# Patient Record
Sex: MALE | Race: WHITE | NOT HISPANIC OR LATINO | Employment: OTHER | URBAN - METROPOLITAN AREA
[De-identification: names, ages, dates, MRNs, and addresses within clinical notes are randomized per-mention and may not be internally consistent; named-entity substitution may affect disease eponyms.]

---

## 2017-11-10 ENCOUNTER — APPOINTMENT (OUTPATIENT)
Dept: GENERAL RADIOLOGY | Facility: CLINIC | Age: 72
End: 2017-11-10
Attending: FAMILY MEDICINE
Payer: MEDICARE

## 2017-11-10 ENCOUNTER — HOSPITAL ENCOUNTER (EMERGENCY)
Facility: CLINIC | Age: 72
Discharge: HOME | End: 2017-11-10
Attending: FAMILY MEDICINE | Admitting: FAMILY MEDICINE
Payer: MEDICARE

## 2017-11-10 ENCOUNTER — APPOINTMENT (OUTPATIENT)
Dept: ULTRASOUND IMAGING | Facility: CLINIC | Age: 72
End: 2017-11-10
Attending: FAMILY MEDICINE
Payer: MEDICARE

## 2017-11-10 ENCOUNTER — APPOINTMENT (OUTPATIENT)
Dept: CT IMAGING | Facility: CLINIC | Age: 72
End: 2017-11-10
Attending: FAMILY MEDICINE
Payer: MEDICARE

## 2017-11-10 VITALS
HEART RATE: 115 BPM | RESPIRATION RATE: 18 BRPM | TEMPERATURE: 101 F | SYSTOLIC BLOOD PRESSURE: 98 MMHG | DIASTOLIC BLOOD PRESSURE: 73 MMHG | OXYGEN SATURATION: 92 %

## 2017-11-10 DIAGNOSIS — R50.9 FEVER WITH CHILLS: ICD-10-CM

## 2017-11-10 DIAGNOSIS — R00.0 SINUS TACHYCARDIA: ICD-10-CM

## 2017-11-10 PROBLEM — Z96.651 S/P TOTAL KNEE REPLACEMENT, RIGHT: Status: ACTIVE | Noted: 2017-10-16

## 2017-11-10 LAB
ALBUMIN UR-MCNC: NEGATIVE MG/DL
ANION GAP SERPL CALCULATED.3IONS-SCNC: 6 MMOL/L (ref 3–14)
APPEARANCE UR: CLEAR
BASOPHILS # BLD AUTO: 0 10E9/L (ref 0–0.2)
BASOPHILS NFR BLD AUTO: 0.2 %
BILIRUB UR QL STRIP: NEGATIVE
BUN SERPL-MCNC: 13 MG/DL (ref 7–30)
CALCIUM SERPL-MCNC: 8.5 MG/DL (ref 8.5–10.1)
CHLORIDE SERPL-SCNC: 103 MMOL/L (ref 94–109)
CO2 SERPL-SCNC: 27 MMOL/L (ref 20–32)
COLOR UR AUTO: YELLOW
CREAT SERPL-MCNC: 0.8 MG/DL (ref 0.66–1.25)
D DIMER PPP FEU-MCNC: 2.9 UG/ML FEU (ref 0–0.5)
DIFFERENTIAL METHOD BLD: ABNORMAL
EOSINOPHIL # BLD AUTO: 0 10E9/L (ref 0–0.7)
EOSINOPHIL NFR BLD AUTO: 0.2 %
ERYTHROCYTE [DISTWIDTH] IN BLOOD BY AUTOMATED COUNT: 12.8 % (ref 10–15)
FLUAV+FLUBV AG SPEC QL: NEGATIVE
FLUAV+FLUBV AG SPEC QL: NEGATIVE
GFR SERPL CREATININE-BSD FRML MDRD: >90 ML/MIN/1.7M2
GLUCOSE SERPL-MCNC: 137 MG/DL (ref 70–99)
GLUCOSE UR STRIP-MCNC: NEGATIVE MG/DL
HCT VFR BLD AUTO: 39.7 % (ref 40–53)
HGB BLD-MCNC: 13.1 G/DL (ref 13.3–17.7)
HGB UR QL STRIP: ABNORMAL
IMM GRANULOCYTES # BLD: 0 10E9/L (ref 0–0.4)
IMM GRANULOCYTES NFR BLD: 0.2 %
INR PPP: 1.14 (ref 0.86–1.14)
KETONES UR STRIP-MCNC: NEGATIVE MG/DL
LACTATE BLD-SCNC: 1.5 MMOL/L (ref 0.7–2)
LACTATE SERPL-SCNC: 1.4 MMOL/L (ref 0.4–2)
LEUKOCYTE ESTERASE UR QL STRIP: NEGATIVE
LYMPHOCYTES # BLD AUTO: 0.4 10E9/L (ref 0.8–5.3)
LYMPHOCYTES NFR BLD AUTO: 3 %
MCH RBC QN AUTO: 32 PG (ref 26.5–33)
MCHC RBC AUTO-ENTMCNC: 33 G/DL (ref 31.5–36.5)
MCV RBC AUTO: 97 FL (ref 78–100)
MONOCYTES # BLD AUTO: 0.7 10E9/L (ref 0–1.3)
MONOCYTES NFR BLD AUTO: 5.1 %
MUCOUS THREADS #/AREA URNS LPF: PRESENT /LPF
NEUTROPHILS # BLD AUTO: 11.8 10E9/L (ref 1.6–8.3)
NEUTROPHILS NFR BLD AUTO: 91.3 %
NITRATE UR QL: NEGATIVE
NT-PROBNP SERPL-MCNC: 467 PG/ML (ref 0–900)
PH UR STRIP: 7 PH (ref 5–7)
PLATELET # BLD AUTO: 281 10E9/L (ref 150–450)
POTASSIUM SERPL-SCNC: 3.7 MMOL/L (ref 3.4–5.3)
RBC # BLD AUTO: 4.09 10E12/L (ref 4.4–5.9)
RBC #/AREA URNS AUTO: 5 /HPF (ref 0–2)
SODIUM SERPL-SCNC: 136 MMOL/L (ref 133–144)
SOURCE: ABNORMAL
SP GR UR STRIP: 1.02 (ref 1–1.03)
SPECIMEN SOURCE: NORMAL
TROPONIN I SERPL-MCNC: <0.015 UG/L (ref 0–0.04)
TSH SERPL DL<=0.005 MIU/L-ACNC: 0.86 MU/L (ref 0.4–4)
UROBILINOGEN UR STRIP-MCNC: 2 MG/DL (ref 0–2)
WBC # BLD AUTO: 12.9 10E9/L (ref 4–11)
WBC #/AREA URNS AUTO: 0 /HPF (ref 0–2)

## 2017-11-10 PROCEDURE — 25000132 ZZH RX MED GY IP 250 OP 250 PS 637: Mod: GY | Performed by: FAMILY MEDICINE

## 2017-11-10 PROCEDURE — 93971 EXTREMITY STUDY: CPT | Mod: RT

## 2017-11-10 PROCEDURE — 93010 ELECTROCARDIOGRAM REPORT: CPT | Mod: Z6 | Performed by: FAMILY MEDICINE

## 2017-11-10 PROCEDURE — 85025 COMPLETE CBC W/AUTO DIFF WBC: CPT | Performed by: FAMILY MEDICINE

## 2017-11-10 PROCEDURE — 81001 URINALYSIS AUTO W/SCOPE: CPT | Performed by: FAMILY MEDICINE

## 2017-11-10 PROCEDURE — 85610 PROTHROMBIN TIME: CPT | Performed by: FAMILY MEDICINE

## 2017-11-10 PROCEDURE — 87804 INFLUENZA ASSAY W/OPTIC: CPT | Performed by: FAMILY MEDICINE

## 2017-11-10 PROCEDURE — 71020 XR CHEST 2 VW: CPT

## 2017-11-10 PROCEDURE — 25000128 H RX IP 250 OP 636: Performed by: FAMILY MEDICINE

## 2017-11-10 PROCEDURE — 84443 ASSAY THYROID STIM HORMONE: CPT | Performed by: FAMILY MEDICINE

## 2017-11-10 PROCEDURE — 87040 BLOOD CULTURE FOR BACTERIA: CPT | Mod: 91 | Performed by: FAMILY MEDICINE

## 2017-11-10 PROCEDURE — 96360 HYDRATION IV INFUSION INIT: CPT

## 2017-11-10 PROCEDURE — 83605 ASSAY OF LACTIC ACID: CPT | Performed by: FAMILY MEDICINE

## 2017-11-10 PROCEDURE — 93005 ELECTROCARDIOGRAM TRACING: CPT

## 2017-11-10 PROCEDURE — 71260 CT THORAX DX C+: CPT

## 2017-11-10 PROCEDURE — 25000125 ZZHC RX 250: Performed by: FAMILY MEDICINE

## 2017-11-10 PROCEDURE — A9270 NON-COVERED ITEM OR SERVICE: HCPCS | Mod: GY | Performed by: FAMILY MEDICINE

## 2017-11-10 PROCEDURE — 80048 BASIC METABOLIC PNL TOTAL CA: CPT | Performed by: FAMILY MEDICINE

## 2017-11-10 PROCEDURE — 83880 ASSAY OF NATRIURETIC PEPTIDE: CPT | Performed by: FAMILY MEDICINE

## 2017-11-10 PROCEDURE — 99285 EMERGENCY DEPT VISIT HI MDM: CPT | Mod: 25

## 2017-11-10 PROCEDURE — 85379 FIBRIN DEGRADATION QUANT: CPT | Performed by: FAMILY MEDICINE

## 2017-11-10 PROCEDURE — 96361 HYDRATE IV INFUSION ADD-ON: CPT

## 2017-11-10 PROCEDURE — 84484 ASSAY OF TROPONIN QUANT: CPT | Performed by: FAMILY MEDICINE

## 2017-11-10 PROCEDURE — 99285 EMERGENCY DEPT VISIT HI MDM: CPT | Mod: 25 | Performed by: FAMILY MEDICINE

## 2017-11-10 RX ORDER — SODIUM CHLORIDE 9 MG/ML
1000 INJECTION, SOLUTION INTRAVENOUS CONTINUOUS
Status: DISCONTINUED | OUTPATIENT
Start: 2017-11-10 | End: 2017-11-10 | Stop reason: HOSPADM

## 2017-11-10 RX ORDER — ACETAMINOPHEN 500 MG
1000 TABLET ORAL ONCE
Status: COMPLETED | OUTPATIENT
Start: 2017-11-10 | End: 2017-11-10

## 2017-11-10 RX ORDER — IOPAMIDOL 755 MG/ML
90 INJECTION, SOLUTION INTRAVASCULAR ONCE
Status: COMPLETED | OUTPATIENT
Start: 2017-11-10 | End: 2017-11-10

## 2017-11-10 RX ADMIN — SODIUM CHLORIDE 110 ML: 9 INJECTION, SOLUTION INTRAVENOUS at 09:08

## 2017-11-10 RX ADMIN — SODIUM CHLORIDE 1000 ML: 9 INJECTION, SOLUTION INTRAVENOUS at 11:38

## 2017-11-10 RX ADMIN — IOPAMIDOL 90 ML: 755 INJECTION, SOLUTION INTRAVENOUS at 09:08

## 2017-11-10 RX ADMIN — SODIUM CHLORIDE 500 ML: 9 INJECTION, SOLUTION INTRAVENOUS at 08:08

## 2017-11-10 RX ADMIN — ACETAMINOPHEN 1000 MG: 500 TABLET ORAL at 12:11

## 2017-11-10 ASSESSMENT — ENCOUNTER SYMPTOMS
ABDOMINAL PAIN: 0
CONSTIPATION: 0
FREQUENCY: 0
VOMITING: 0
FEVER: 0
PALPITATIONS: 0
DYSURIA: 0
SHORTNESS OF BREATH: 1
DIAPHORESIS: 0
CHILLS: 1
COUGH: 0
BLOOD IN STOOL: 0
DIARRHEA: 0
SINUS PRESSURE: 0
WHEEZING: 0
SORE THROAT: 0
NAUSEA: 0
HEADACHES: 0

## 2017-11-10 NOTE — ED NOTES
Pt alert answering all questions appropriately, following all commands, clear speech, no facial droop. Pt reports having CP off and on, short twinges of pain.

## 2017-11-10 NOTE — ED AVS SNAPSHOT
[facility] Emergency Department    [address] [address]    Phone:  [phone]    Fax:  [phone]                                       Rick Rodrigues   MRN: [id number]    Department:  [facility] Emergency Department   Date of Visit:  11/10/2017           After Visit Summary Signature Page     I have received my discharge instructions, and my questions have been answered. I have discussed any challenges I see with this plan with the nurse or doctor.    ..........................................................................................................................................  Patient/Patient Representative Signature      ..........................................................................................................................................  Patient Representative Print Name and Relationship to Patient    ..................................................               ................................................  Date                                            Time    ..........................................................................................................................................  Reviewed by Signature/Title    ...................................................              ..............................................  Date                                                            Time

## 2017-11-10 NOTE — ED PROVIDER NOTES
History     Chief Complaint   Patient presents with     Chills     Pt having chills yesterday around 8 pm.  Pt shaking, needing to be warmed up.     Altered Mental Status     Pt woke up slightly confused, pt and wife noticed the confusion.  Wife noticed artery in R neck was pumping     Chest Pain     Had 2 short episodes of chest pain.     HPI  Rick Rodrigues is a 72 year old male who presents with a history of recent right knee replacement in the last month and for which she was on warfarin up until a week ago and then has since been on aspirin 325 mg, also with a history of sleep apnea, coronary artery disease, hyperlipidemia, hypertension, small abdominal aortic aneurysm of 3 cm in 2010 and 2014.  Pending repeat ultrasound this year.    Last evening he was feeling chilled and could not get warm.  That seemed to resolve overnight.  No associated fever or infectious symptoms denied associated cough and upper respiratory congestion.  No abdominal pain.  No change in urine or stool.  Felt thirsty.  This morning he had 2 very brief episodes of left-sided chest pain that lasted for a couple seconds each.  This seemed to resolve.  Overnight some sense of dyspnea.  No difficulty on exertion.  Able to ambulate into this department without associated chest pain or shortness of breath.  No lightheadedness.    No prior history of DVT but does note increased swelling of the right lower extremity.    Problem List:    Patient Active Problem List    Diagnosis Date Noted     S/P total knee replacement, right 10/16/2017     Priority: Medium     Sleep apnea 12/21/2012     Priority: Medium     AAA (abdominal aortic aneurysm) (H) 09/02/2010     Priority: Medium     Overview:   3 cm just above bifurcation  Recommend q2-3yr u/s monitoring  No change dec 2014 repeat 2017       Coronary atherosclerosis 07/18/2007     Priority: Medium     Overview:   pos heart scan 1/05  Neg cardiolyte gxt 2003, 1/2008,  Ej fx 57%        Hyperlipidemia 07/18/2007     Priority: Medium     Esophageal reflux 07/18/2007     Priority: Medium     Essential hypertension 07/18/2007     Priority: Medium        Past Medical History:    No past medical history on file.    Past Surgical History:    Past Surgical History:   Procedure Laterality Date     COLONOSCOPY  12/20/2010    COLONOSCOPY performed by KURT SCOTT at [facility]     COLONOSCOPY  5/26/2011    Procedure:COLONOSCOPY; Surgeon:KURT SCOTT; Location:[facility]       Family History:    No family history on file.    Social History:  Marital Status:   [2]  Social History   Substance Use Topics     Smoking status: Not on file     Smokeless tobacco: Not on file     Alcohol use Not on file        Medications:      warfarin (COUMADIN) 2.5 MG tablet   atorvastatin (LIPITOR) 40 MG tablet   clotrimazole-betamethasone (LOTRISONE) cream   aspirin - buffered ([employer] BUFFERED ASPIRIN) 325 MG TABS tablet   acetaminophen (TYLENOL) 500 MG tablet   NIFEdipine ER osmotic (PROCARDIA XL) 60 MG TB24   omeprazole (PRILOSEC) 20 MG CR capsule   AMOXICILLIN PO   Acetaminophen (TYLENOL PO)   omeprazole (PRILOSEC) 20 MG capsule   NIFEdipine (ADALAT CC) 60 MG 24 hr tablet   fenofibrate 160 MG tablet   pravastatin (PRAVACHOL) 40 MG tablet   aspirin 81 MG tablet   ibuprofen (ADVIL,MOTRIN) 600 MG tablet         Review of Systems   Constitutional: Positive for chills. Negative for diaphoresis and fever.   HENT: Negative for ear pain, sinus pressure and sore throat.    Eyes: Negative for visual disturbance.   Respiratory: Positive for shortness of breath. Negative for cough and wheezing.    Cardiovascular: Positive for chest pain. Negative for palpitations.   Gastrointestinal: Negative for abdominal pain, blood in stool, constipation, diarrhea, nausea and vomiting.   Genitourinary: Negative for dysuria, frequency and urgency.   Skin: Negative for rash.   Neurological: Negative for headaches.   All other systems reviewed and are  negative.      Physical Exam   BP: (!) 142/92  Pulse: 124  Heart Rate: 124  Temp: 99.8  F (37.7  C)  Resp: 16  SpO2: 94 %      Physical Exam   Constitutional: He appears distressed.   HENT:   Mouth/Throat: Oropharynx is clear and moist.   Eyes: Conjunctivae are normal.   Neck: Neck supple.   Cardiovascular: Regular rhythm.  Tachycardia present.  Exam reveals no gallop and no friction rub.    No murmur heard.  Pulmonary/Chest: Effort normal and breath sounds normal. No respiratory distress. He has no wheezes. He has no rales.   Abdominal: Soft. Bowel sounds are normal. He exhibits no distension. There is no tenderness. There is no rebound and no guarding.   Musculoskeletal: He exhibits no edema.   Neurological: He is alert.   Skin: No rash noted. He is not diaphoretic.      Knee is without signs of infection.    ED Course     ED Course     Procedures                  EKG Interpretation:      Interpreted by Jeff Diaz MD  EKG done at 733 hours demonstrates a sinus rhythm at 122 bpm with a normal axis and no ST-T change.  No T wave changes.  Other than flattening of the T waves in leads 23 aVF.  As well as the lateral leads V5 and V6.  Normal intervals - except for a short NV.  Normal conduction.  Poor R progression V1 through V3.  Q waves in the inferior leads.  No ectopy.    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     Critical Care time:  none               Results for orders placed or performed during the hospital encounter of 11/10/17   XR Chest 2 Views    Narrative    CHEST TWO VIEWS  11/10/2017 9:10 AM    HISTORY:  Sinus tachycardia.    COMPARISON:  None.      Impression    IMPRESSION:  Negative.     LEAH MARTÍNEZ MD   US Lower Extremity Venous Duplex Right    Narrative    VENOUS ULTRASOUND RIGHT LEG  11/10/2017 8:56 AM     HISTORY: edema, recent TKA;     COMPARISON: None.    FINDINGS:  Examination of the deep veins with graded compression and  color flow Doppler with spectral  wave form analysis shows no evidence  of thrombus in the common femoral vein, femoral vein, popliteal vein  or calf veins.        Impression    IMPRESSION: No evidence of deep venous thrombosis in the right lower  extremity.     HERMES DOYLE, DO   Chest CT - IV contrast only - PE protocol    Narrative    CT CHEST PULMONARY EMBOLISM WITH CONTRAST  11/10/2017 9:13 AM     HISTORY: Chest pain, dyspnea. Status post total knee arthroplasty.  Evaluate for PE, as above.    TECHNIQUE: Thin section axial images are performed from the thoracic  inlet to the lung bases utilizing 90 mL of Isovue 370 IV contrast  without adverse event. Coronal reformatted images are also generated.  Radiation dose for this scan was reduced using automated exposure  control, adjustment of the mA and/or kV according to patient size, or  iterative reconstruction technique.    FINDINGS:  A few calcified pleural plaques are noted bilaterally in  the lower hemithoraces. Findings could reflect previous asbestos  exposure. Old hemothorax could account for these findings as well. No  focal infiltrate or consolidation. Atelectatic lung base changes are  noted. A large hiatal hernia is present. No enlarged lymph nodes. No  evidence of pulmonary embolism. Thoracic aorta is unremarkable with  scattered calcified atherosclerotic plaque. Limited images upper  abdomen demonstrate mild thickening left adrenal gland. Partially  imaged cyst is noted in the mid left kidney. Numerous colonic  diverticula are noted where imaged. Degenerative spine changes are  present.      Impression    IMPRESSION:  1. No evidence of pulmonary embolism. Thoracic aorta is unremarkable.  2. Lungs are clear. No enlarged lymph nodes.  3. Large hiatal hernia without obvious obstruction.  4. Simple appearing, partially imaged left renal cyst. No obvious  hydronephrosis where visualized.  5. Colonic diverticulosis.    LUZ DELACRUZ MD   CBC with platelets differential   Result Value Ref  Range    WBC 12.9 (H) 4.0 - 11.0 10e9/L    RBC Count 4.09 (L) 4.4 - 5.9 10e12/L    Hemoglobin 13.1 (L) 13.3 - 17.7 g/dL    Hematocrit 39.7 (L) 40.0 - 53.0 %    MCV 97 78 - 100 fl    MCH 32.0 26.5 - 33.0 pg    MCHC 33.0 31.5 - 36.5 g/dL    RDW 12.8 10.0 - 15.0 %    Platelet Count 281 150 - 450 10e9/L    Diff Method Automated Method     % Neutrophils 91.3 %    % Lymphocytes 3.0 %    % Monocytes 5.1 %    % Eosinophils 0.2 %    % Basophils 0.2 %    % Immature Granulocytes 0.2 %    Absolute Neutrophil 11.8 (H) 1.6 - 8.3 10e9/L    Absolute Lymphocytes 0.4 (L) 0.8 - 5.3 10e9/L    Absolute Monocytes 0.7 0.0 - 1.3 10e9/L    Absolute Eosinophils 0.0 0.0 - 0.7 10e9/L    Absolute Basophils 0.0 0.0 - 0.2 10e9/L    Abs Immature Granulocytes 0.0 0 - 0.4 10e9/L   Basic metabolic panel   Result Value Ref Range    Sodium 136 133 - 144 mmol/L    Potassium 3.7 3.4 - 5.3 mmol/L    Chloride 103 94 - 109 mmol/L    Carbon Dioxide 27 20 - 32 mmol/L    Anion Gap 6 3 - 14 mmol/L    Glucose 137 (H) 70 - 99 mg/dL    Urea Nitrogen 13 7 - 30 mg/dL    Creatinine 0.80 0.66 - 1.25 mg/dL    GFR Estimate >90 >60 mL/min/1.7m2    GFR Estimate If Black >90 >60 mL/min/1.7m2    Calcium 8.5 8.5 - 10.1 mg/dL   Troponin I   Result Value Ref Range    Troponin I ES <0.015 0.000 - 0.045 ug/L   D dimer quantitative   Result Value Ref Range    D Dimer 2.9 (H) 0.0 - 0.50 ug/ml FEU   Lactic acid   Result Value Ref Range    Lactic Acid 1.4 0.4 - 2.0 mmol/L   Nt probnp inpatient (BNP)   Result Value Ref Range    N-Terminal Pro BNP Inpatient 467 0 - 900 pg/mL   UA with Microscopic   Result Value Ref Range    Color Urine Yellow     Appearance Urine Clear     Glucose Urine Negative NEG^Negative mg/dL    Bilirubin Urine Negative NEG^Negative    Ketones Urine Negative NEG^Negative mg/dL    Specific Gravity Urine 1.016 1.003 - 1.035    Blood Urine Trace (A) NEG^Negative    pH Urine 7.0 5.0 - 7.0 pH    Protein Albumin Urine Negative NEG^Negative mg/dL    Urobilinogen mg/dL  2.0 0.0 - 2.0 mg/dL    Nitrite Urine Negative NEG^Negative    Leukocyte Esterase Urine Negative NEG^Negative    Source Midstream Urine     WBC Urine 0 0 - 2 /HPF    RBC Urine 5 (H) 0 - 2 /HPF    Mucous Urine Present (A) NEG^Negative /LPF   INR   Result Value Ref Range    INR 1.14 0.86 - 1.14   TSH with free T4 reflex   Result Value Ref Range    TSH 0.86 0.40 - 4.00 mU/L   Lactic acid whole blood   Result Value Ref Range    Lactic Acid 1.5 0.7 - 2.0 mmol/L   Influenza A/B antigen   Result Value Ref Range    Influenza A/B Agn Specimen Nasal     Influenza A Negative NEG^Negative    Influenza B Negative NEG^Negative   Blood culture   Result Value Ref Range    Specimen Description Blood Left Arm     Special Requests Aerobic and anaerobic bottles received     Culture Micro No growth after 1 hour    Blood culture   Result Value Ref Range    Specimen Description Blood Right Arm     Special Requests Aerobic and anaerobic bottles received     Culture Micro No growth after 1 hour          Assessments & Plan (with Medical Decision Making)     MDM: Rick Rodrigues is a 72 year old male Who presented with a history of recent total knee arthroplasty RT, and chills and low-grade temperature in 99 range, As well as possible lower extremity edema right side more than left.  He also had a sinus tachycardia that was fluid responsive, but no associated localizing symptoms.   Differential diagnosis was primarily Infectious vs VTE vs cardiac cause.   His knee with TKA demonstrated completely normal range of motion with no overlying erythema or swelling or signs of infection.      Initial laboratory testing included a chest x-ray as well as initial EKG, laboratory testing including troponin and lactic acid as well as d-dimer.  EKG showed only a sinus tachycardia.  Lactic acid was normal.  Before the d-dimer was back a right venous Ultrasound was performed and was negative for DVT.  D-dimer was elevated in the CT chest was performed  which demonstrated no PE and no pneumonia.     Patient then Spiked a temperature to 101, And was given Tylenol as well as  Additional fluid bolus. Influenza swab was obtained. Influenza negative.  Blood cultures were also performed in lactic acid was repeated as it had been two hours from the first.  This lactate was also normal.     Source was unclear in heart rate did decrease to the low 100 range.  The patient feels well and wishes to return home.  I offered observation stay given the fever without obvious source and tachycardia.  He understands the potential for occult infection that may progress.  We discussed possible infections such as SBE, intraabdominal source, meningitis.  The knee may develop findings.  persistent fever without source may be tick borne.   This may also be A viral syndrome as it has been present for less than 24 hours and may not have associated findings.     I told him that if he is not improving or is worsening over the weekend that he should be reevaluated.  I wish to be cautious regarding his history of total knee And also the sinus tachycardia seen today.          I have reviewed the nursing notes.    I have reviewed the findings, diagnosis, plan and need for follow up with the patient.     New Prescriptions    No medications on file       Final diagnoses:   Fever with chills - unclear cause.  No obvious source of infection.  stay hydrated.  recheck in clinic by monday, and immed here for worsening.   also have knee reevaluated monday   Sinus tachycardia - This appears associated with fever, dehydration - but may be associated with serious infection.  stay hydrated.  tylenol for fever.       11/10/2017   [facility] EMERGENCY DEPARTMENT     Jeff Diaz MD  11/10/17 2003

## 2017-11-10 NOTE — ED NOTES
Patient ambulated to restroom.      Blood cultures, influenza and repeat lactate orders received from MD.

## 2017-11-10 NOTE — DISCHARGE INSTRUCTIONS
ICD-10-CM    1. Fever with chills R50.9     unclear cause.  No obvious source of infection.  stay hydrated.  recheck in clinic by monday, and immed here for worsening.   also have knee reevaluated monday   2. Sinus tachycardia R00.0     This appears associated with fever, dehydration - but may be associated with serious infection.  stay hydrated.  tylenol for fever.         Febrile Illness, Uncertain Cause (Adult)  You have a fever, but the cause is not certain. A fever is a natural reaction of the body to an illness such as infection due to a virus or bacteria. In most cases, the temperature itself is not harmful. It actually helps the body fight infections. A fever does not need to be treated unless you feel very uncomfortable.  Sometimes a fever can be an early sign of a more serious infection, so make sure to follow up if your condition worsens.  Home care  Unless given other instructions by your healthcare provider, follow these guidelines when caring for yourself at home.  General care    If your symptoms are severe, rest at home for the first 2 to 3 days. When you resume activity, don't let yourself get too tired.    Do not smoke. Also avoid being exposed to secondhand smoke.    Your appetite may be poor, so a light diet is fine. Avoid dehydration by drinking 6 to 8 glasses of fluids per day (such as water, soft drinks, sports drinks, juices, tea, or soup). Extra fluids will help loosen secretions in the nose and lungs.  Medicines    You can take acetaminophen or ibuprofen for pain, unless you were given a different fever-reducing/pain medicine to use. (Note: If you have chronic liver or kidney disease or have ever had a stomach ulcer or gastrointestinal bleeding, talk with your healthcare provider before using these medicines. Also talk to your provider if you are taking medicine to prevent blood clots.) Aspirin should never be given to anyone younger than 18 years of age who is ill with a viral infection  or fever. It may cause severe liver or brain damage.    If you were given antibiotics, take them until they are used up, or your healthcare provider tells you to stop. It is important to finish the antibiotics even though you feel better. This is to make sure the infection has cleared. Be aware that antibiotics are not usually given for a fever with an unknown cause.    Over-the-counter medicines will not shorten the duration of the illness. However, they may be helpful for the following symptoms: cough, sore throat, or nasal and sinus congestion. Ask your pharmacist for product suggestions. (Note: Do not use decongestants if you have high blood pressure.)  Follow-up care  Follow up with your healthcare provider, or as advised.    If a culture was done, you will be notified if your treatment needs to be changed. You can call as directed for the results.    If X-rays, a CT, or an ultrasound were done, a specialist will review them. You will be notified of any findings that may affect your care.  Call 911  Contact emergency services right away if any of these occur:    Trouble breathing or swallowing, or wheezing    Chest pain    Confusion    Extreme drowsiness or trouble awakening    Fainting or loss of consciousness    Rapid heart rate    Low blood pressure    Vomiting blood, or large amounts of blood in stool    Seizure  When to seek medical advice  Call your healthcare provider right away if any of these occur:    Cough with lots of colored sputum (mucus) or blood in your sputum    Severe headache    Face, neck, throat, or ear pain    Feeling drowsy    Abdominal pain    Repeated vomiting or diarrhea    Joint pain or a new rash    Burning when urinating    Fever of 100.4 F (38 C) or higher, that does not get better after taking fever-reducing medicine    Feeling weak or dizzy  Date Last Reviewed: 7/30/2015 2000-2017 [employer]. [address] [place] [address], [place] [address]. All rights reserved. This  information is not intended as a substitute for professional medical care. Always follow your healthcare professional's instructions.

## 2017-11-10 NOTE — ED AVS SNAPSHOT
[facility] Department    [address] [address]    Phone:  [phone]    Fax:  [phone]                                       Rick Rodrigues   MRN: [id number]    Department:  [facility]   Date of Visit:  11/10/2017           Patient Information     Date Of Birth          1945        Your diagnoses for this visit were:     Fever with chills unclear cause.  No obvious source of infection.  stay hydrated.  recheck in clinic by monday, and immed here for worsening.   also have knee reevaluated monday    Sinus tachycardia This appears associated with fever, dehydration - but may be associated with serious infection.  stay hydrated.  tylenol for fever.       You were seen by Jeff Diaz MD.      Follow-up Information     Follow up with Efraín Jeff MD In 3 days.    Specialty:  Family Practice    Contact information:    [facility]  [address] [address]  [phone]          Follow up with [facility].    Specialty:  EMERGENCY MEDICINE    Why:  As needed, If symptoms worsen    Contact information:    [address] [place] [id number] [phone]    Additional information:    The medical center is located at   [address]. (between [address] and   [address] in [place], [place] north   of [place]).        Discharge Instructions         ICD-10-CM    1. Fever with chills R50.9     unclear cause.  No obvious source of infection.  stay hydrated.  recheck in clinic by monday, and immed here for worsening.   also have knee reevaluated monday   2. Sinus tachycardia R00.0     This appears associated with fever, dehydration - but may be associated with serious infection.  stay hydrated.  tylenol for fever.         Febrile Illness, Uncertain Cause (Adult)  You have a fever, but the cause is not certain. A fever is a natural reaction of the body to an illness such as  infection due to a virus or bacteria. In most cases, the temperature itself is not harmful. It actually helps the body fight infections. A fever does not need to be treated unless you feel very uncomfortable.  Sometimes a fever can be an early sign of a more serious infection, so make sure to follow up if your condition worsens.  Home care  Unless given other instructions by your healthcare provider, follow these guidelines when caring for yourself at home.  General care    If your symptoms are severe, rest at home for the first 2 to 3 days. When you resume activity, don't let yourself get too tired.    Do not smoke. Also avoid being exposed to secondhand smoke.    Your appetite may be poor, so a light diet is fine. Avoid dehydration by drinking 6 to 8 glasses of fluids per day (such as water, soft drinks, sports drinks, juices, tea, or soup). Extra fluids will help loosen secretions in the nose and lungs.  Medicines    You can take acetaminophen or ibuprofen for pain, unless you were given a different fever-reducing/pain medicine to use. (Note: If you have chronic liver or kidney disease or have ever had a stomach ulcer or gastrointestinal bleeding, talk with your healthcare provider before using these medicines. Also talk to your provider if you are taking medicine to prevent blood clots.) Aspirin should never be given to anyone younger than 18 years of age who is ill with a viral infection or fever. It may cause severe liver or brain damage.    If you were given antibiotics, take them until they are used up, or your healthcare provider tells you to stop. It is important to finish the antibiotics even though you feel better. This is to make sure the infection has cleared. Be aware that antibiotics are not usually given for a fever with an unknown cause.    Over-the-counter medicines will not shorten the duration of the illness. However, they may be helpful for the following symptoms: cough, sore throat, or nasal  and sinus congestion. Ask your pharmacist for product suggestions. (Note: Do not use decongestants if you have high blood pressure.)  Follow-up care  Follow up with your healthcare provider, or as advised.    If a culture was done, you will be notified if your treatment needs to be changed. You can call as directed for the results.    If X-rays, a CT, or an ultrasound were done, a specialist will review them. You will be notified of any findings that may affect your care.  Call 911  Contact emergency services right away if any of these occur:    Trouble breathing or swallowing, or wheezing    Chest pain    Confusion    Extreme drowsiness or trouble awakening    Fainting or loss of consciousness    Rapid heart rate    Low blood pressure    Vomiting blood, or large amounts of blood in stool    Seizure  When to seek medical advice  Call your healthcare provider right away if any of these occur:    Cough with lots of colored sputum (mucus) or blood in your sputum    Severe headache    Face, neck, throat, or ear pain    Feeling drowsy    Abdominal pain    Repeated vomiting or diarrhea    Joint pain or a new rash    Burning when urinating    Fever of 100.4 F (38 C) or higher, that does not get better after taking fever-reducing medicine    Feeling weak or dizzy  Date Last Reviewed: 7/30/2015 2000-2017 [employer]. [address] [place] [address], [place] [address]. All rights reserved. This information is not intended as a substitute for professional medical care. Always follow your healthcare professional's instructions.          Future Appointments        Provider Department Dept Phone Center    11/10/2017 3:00 PM [facility] [place], [place] ROOM 1 [facility] [phone] [facility]      24 Hour Appointment Hotline       To make an appointment at any [facility], call [phone] ([phone]). If you don't have a family doctor or clinic, we will help you find one. [facility] are conveniently  located to serve the needs of you and your family.             Review of your medicines      Our records show that you are taking the medicines listed below. If these are incorrect, please call your family doctor or clinic.        Dose / Directions Last dose taken    acetaminophen 500 MG tablet   Commonly known as:  TYLENOL   Dose:  1000 mg        Take 1,000 mg by mouth every 8 hours as needed   Refills:  0        AMOXICILLIN PO        Take  by mouth.   Refills:  0        atorvastatin 40 MG tablet   Commonly known as:  LIPITOR   Dose:  40 mg        Take 40 mg by mouth   Refills:  0        [employer] BUFFERED ASPIRIN 325 MG Tabs tablet   Dose:  325 mg   Generic drug:  aspirin - buffered        Take 325 mg by mouth daily   Refills:  0        ibuprofen 600 MG tablet   Commonly known as:  ADVIL/MOTRIN   Dose:  600 mg        Take 600 mg by mouth every 8 hours as needed   Refills:  0        NIFEdipine ER osmotic 60 MG Tb24   Commonly known as:  PROCARDIA XL   Dose:  60 mg        Take 60 mg by mouth daily   Refills:  0        omeprazole 20 MG CR capsule   Commonly known as:  priLOSEC   Dose:  20 mg        Take 20 mg by mouth daily   Refills:  0        OXYCODONE HCL PO   Dose:  5 mg        Take 5 mg by mouth every 6 hours as needed   Refills:  0        VITAMIN D (CHOLECALCIFEROL) PO   Dose:  2000 Units        Take 2,000 Units by mouth daily   Refills:  0                Procedures and tests performed during your visit     Procedure/Test Number of Times Performed    Basic metabolic panel 1    Blood culture 2    CBC with platelets differential 1    Cardiac Continuous Monitoring 1    Chest CT - IV contrast only - PE protocol 1    D dimer quantitative 1    EKG 12 lead 1    INR 1    Influenza A/B antigen 1    Lactic acid 1    Lactic acid whole blood 1    Nt probnp inpatient (BNP) 1    Peripheral IV catheter 1    TSH with free T4 reflex 1    Troponin I 1    UA with Microscopic 1    US Lower Extremity Venous Duplex Right 1    XR Chest 2  Views 1      Orders Needing Specimen Collection     None      Pending Results     Date and Time Order Name Status Description    11/10/2017 1210 Blood culture In process     11/10/2017 1210 Blood culture In process             Pending Culture Results     Date and Time Order Name Status Description    11/10/2017 1210 Blood culture In process     11/10/2017 1210 Blood culture In process             Pending Results Instructions     If you had any lab results that were not finalized at the time of your Discharge, you can call the ED Lab Result RN at [phone]. You will be contacted by this team for any positive Lab results or changes in treatment. The nurses are available 7 days a week from 10A to 6:30P.  You can leave a message 24 hours per day and they will return your call.        Test Results From Your Hospital Stay        11/10/2017  7:47 AM      Component Results     Component Value Ref Range & Units Status    WBC 12.9 (H) 4.0 - 11.0 10e9/L Final    RBC Count 4.09 (L) 4.4 - 5.9 10e12/L Final    Hemoglobin 13.1 (L) 13.3 - 17.7 g/dL Final    Hematocrit 39.7 (L) 40.0 - 53.0 % Final    MCV 97 78 - 100 fl Final    MCH 32.0 26.5 - 33.0 pg Final    MCHC 33.0 31.5 - 36.5 g/dL Final    RDW 12.8 10.0 - 15.0 % Final    Platelet Count 281 150 - 450 10e9/L Final    Diff Method Automated Method  Final    % Neutrophils 91.3 % Final    % Lymphocytes 3.0 % Final    % Monocytes 5.1 % Final    % Eosinophils 0.2 % Final    % Basophils 0.2 % Final    % Immature Granulocytes 0.2 % Final    Absolute Neutrophil 11.8 (H) 1.6 - 8.3 10e9/L Final    Absolute Lymphocytes 0.4 (L) 0.8 - 5.3 10e9/L Final    Absolute Monocytes 0.7 0.0 - 1.3 10e9/L Final    Absolute Eosinophils 0.0 0.0 - 0.7 10e9/L Final    Absolute Basophils 0.0 0.0 - 0.2 10e9/L Final    Abs Immature Granulocytes 0.0 0 - 0.4 10e9/L Final         11/10/2017  8:21 AM      Component Results     Component Value Ref Range & Units Status    Sodium 136 133 - 144 mmol/L Final     Potassium 3.7 3.4 - 5.3 mmol/L Final    Chloride 103 94 - 109 mmol/L Final    Carbon Dioxide 27 20 - 32 mmol/L Final    Anion Gap 6 3 - 14 mmol/L Final    Glucose 137 (H) 70 - 99 mg/dL Final    Urea Nitrogen 13 7 - 30 mg/dL Final    Creatinine 0.80 0.66 - 1.25 mg/dL Final    GFR Estimate >90 >60 mL/min/1.7m2 Final    Non  GFR Calc    GFR Estimate If Black >90 >60 mL/min/1.7m2 Final    African American GFR Calc    Calcium 8.5 8.5 - 10.1 mg/dL Final         11/10/2017  8:21 AM      Component Results     Component Value Ref Range & Units Status    Troponin I ES <0.015 0.000 - 0.045 ug/L Final    The 99th percentile for upper reference range is 0.045 ug/L.  Troponin values   in the range of 0.045 - 0.120 ug/L may be associated with risks of adverse   clinical events.           11/10/2017  8:54 AM      Component Results     Component Value Ref Range & Units Status    D Dimer 2.9 (H) 0.0 - 0.50 ug/ml FEU Final    This D-dimer assay is intended for use in conjunction with a clinical pretest   probability assessment model to exclude pulmonary embolism (PE) and deep   venous thrombosis (DVT) in outpatients suspected of PE or DVT. The cut-off   value is 0.5 ug/mL FEU.           11/10/2017  8:54 AM      Component Results     Component Value Ref Range & Units Status    Lactic Acid 1.4 0.4 - 2.0 mmol/L Final         11/10/2017  9:04 AM      Component Results     Component Value Ref Range & Units Status    N-Terminal Pro BNP Inpatient 467 0 - 900 pg/mL Final       Reference range shown and results flagged as abnormal are suggested inpatient   cut points for confirming diagnosis if CHF in an acute setting. Establishing a   baseline value for each individual patient is useful for follow-up. An   inpatient or emergency department NT-proPBNP <300 pg/mL effectively rules out   acute CHF, with 99% negative predictive value.  The outpatient non-acute reference range for ruling out CHF is:   0-125 pg/mL (age 18 to less  than 75)   0-450 pg/mL (age 75 yrs and older)           11/10/2017  9:55 AM      Component Results     Component Value Ref Range & Units Status    Color Urine Yellow  Final    Appearance Urine Clear  Final    Glucose Urine Negative NEG^Negative mg/dL Final    Bilirubin Urine Negative NEG^Negative Final    Ketones Urine Negative NEG^Negative mg/dL Final    Specific Gravity Urine 1.016 1.003 - 1.035 Final    Blood Urine Trace (A) NEG^Negative Final    pH Urine 7.0 5.0 - 7.0 pH Final    Protein Albumin Urine Negative NEG^Negative mg/dL Final    Urobilinogen mg/dL 2.0 0.0 - 2.0 mg/dL Final    Nitrite Urine Negative NEG^Negative Final    Leukocyte Esterase Urine Negative NEG^Negative Final    Source Midstream Urine  Final    WBC Urine 0 0 - 2 /HPF Final    RBC Urine 5 (H) 0 - 2 /HPF Final    Mucous Urine Present (A) NEG^Negative /LPF Final         11/10/2017  8:54 AM      Component Results     Component Value Ref Range & Units Status    INR 1.14 0.86 - 1.14 Final         11/10/2017 10:12 AM      Narrative     CHEST TWO VIEWS  11/10/2017 9:10 AM    HISTORY:  Sinus tachycardia.    COMPARISON:  None.        Impression     IMPRESSION:  Negative.     LEAH MARTÍNEZ MD         11/10/2017  9:07 AM      Component Results     Component Value Ref Range & Units Status    TSH 0.86 0.40 - 4.00 mU/L Final         11/10/2017  8:59 AM      Narrative     VENOUS ULTRASOUND RIGHT LEG  11/10/2017 8:56 AM     HISTORY: edema, recent TKA;     COMPARISON: None.    FINDINGS:  Examination of the deep veins with graded compression and  color flow Doppler with spectral wave form analysis shows no evidence  of thrombus in the common femoral vein, femoral vein, popliteal vein  or calf veins.          Impression     IMPRESSION: No evidence of deep venous thrombosis in the right lower  extremity.     HERMES DOYLE DO         11/10/2017  9:34 AM      Narrative     CT CHEST PULMONARY EMBOLISM WITH CONTRAST  11/10/2017 9:13 AM     HISTORY: Chest  pain, dyspnea. Status post total knee arthroplasty.  Evaluate for PE, as above.    TECHNIQUE: Thin section axial images are performed from the thoracic  inlet to the lung bases utilizing 90 mL of Isovue 370 IV contrast  without adverse event. Coronal reformatted images are also generated.  Radiation dose for this scan was reduced using automated exposure  control, adjustment of the mA and/or kV according to patient size, or  iterative reconstruction technique.    FINDINGS:  A few calcified pleural plaques are noted bilaterally in  the lower hemithoraces. Findings could reflect previous asbestos  exposure. Old hemothorax could account for these findings as well. No  focal infiltrate or consolidation. Atelectatic lung base changes are  noted. A large hiatal hernia is present. No enlarged lymph nodes. No  evidence of pulmonary embolism. Thoracic aorta is unremarkable with  scattered calcified atherosclerotic plaque. Limited images upper  abdomen demonstrate mild thickening left adrenal gland. Partially  imaged cyst is noted in the mid left kidney. Numerous colonic  diverticula are noted where imaged. Degenerative spine changes are  present.        Impression     IMPRESSION:  1. No evidence of pulmonary embolism. Thoracic aorta is unremarkable.  2. Lungs are clear. No enlarged lymph nodes.  3. Large hiatal hernia without obvious obstruction.  4. Simple appearing, partially imaged left renal cyst. No obvious  hydronephrosis where visualized.  5. Colonic diverticulosis.    LUZ DELACRUZ MD         11/10/2017 12:13 PM      Component Results     Component Value Ref Range & Units Status    Influenza A/B Agn Specimen Nasal  Final    Influenza A Negative NEG^Negative Final    Influenza B Negative NEG^Negative Final    Test results must be correlated with clinical data. If necessary, results   should be confirmed by a molecular assay or viral culture.           11/10/2017 12:04 PM      Component Results     Component Value Ref  "Range & Units Status    Lactic Acid 1.5 0.7 - 2.0 mmol/L Final         11/10/2017 12:24 PM         11/10/2017 12:26 PM                Thank you for choosing [facility]       Thank you for choosing [facility] for your care. Our goal is always to provide you with excellent care. Hearing back from our patients is one way we can continue to improve our services. Please take a few minutes to complete the written survey that you may receive in the mail after you visit with us. Thank you!        [email or web address] Information     [employer] lets you send messages to your doctor, view your test results, renew your prescriptions, schedule appointments and more. To sign up, go to [email or web address] . Click on \"Log in\" on the left side of the screen, which will take you to the Welcome page. Then click on \"Sign up Now\" on the right side of the page.     You will be asked to enter the access code listed below, as well as some personal information. Please follow the directions to create your username and password.     Your access code is: [email or web address]  Expires: 2018  1:40 PM     Your access code will  in 90 days. If you need help or a new code, please call your [facility] [place] clinic or [phone].        Care EveryWhere ID     This is your Care EveryWhere ID. This could be used by other organizations to access your [facility] medical records  [id number]        Equal Access to Services     ORAL WOODWARD : Hadii jen moya Sokeyanna, waaxda luqadaha, qaybta kaalmada venu, rosalinda orr . So [facility] [phone].    ATENCIÓN: Si habla español, tiene a gonzalez disposición servicios gratuitos de asistencia lingüística. Llame al [phone].    We comply with applicable federal civil rights laws and [place] laws. We do not discriminate on the basis of race, color, national origin, age, disability, sex, sexual orientation, or gender identity.            After Visit Summary       This is your record. Keep this with " you and show to your community pharmacist(s) and doctor(s) at your next visit.

## 2017-11-16 LAB
BACTERIA SPEC CULT: NO GROWTH
BACTERIA SPEC CULT: NO GROWTH
Lab: NORMAL
Lab: NORMAL
SPECIMEN SOURCE: NORMAL
SPECIMEN SOURCE: NORMAL

## 2020-08-14 ENCOUNTER — VIRTUAL VISIT (OUTPATIENT)
Dept: FAMILY MEDICINE | Facility: OTHER | Age: 75
End: 2020-08-14

## 2020-08-14 NOTE — PROGRESS NOTES
"Date: 2020 17:18:21  Clinician: Nayely Bulnt  Clinician NPI: [id number]  Patient: Rick Rodrigues  Patient : 1945  Patient Address: [address] [place] [address] , [place] [address]  Patient Phone: [phone]  Visit Protocol: URI  Patient Summary:  Rick is a 75 year old ( : 1945 ) male who initiated a Visit for COVID-19 (Coronavirus) evaluation and screening. When asked the question \"Please sign me up to receive news, health information and promotions. \", Rick responded \"No\".    When asked when his symptoms started, Rick reported that he does not have any symptoms.   He denies taking antibiotic medication in the past month and having recent facial or sinus surgery in the past 60 days.    Pertinent COVID-19 (Coronavirus) information  In the past 14 days, Rick has not worked in a congregate living setting.   He does not work or volunteer as healthcare worker or a  and does not work or volunteer in a healthcare facility.   Rick also has not lived in a congregate living setting in the past 14 days. He does not live with a healthcare worker.   Rick has had a close contact with a laboratory-confirmed COVID-19 patient in the last 14 days. Additional information about contact with COVID-19 (Coronavirus) patient as reported by the patient (free text): My wife tested positive on Aug 13.  It was recommended I be tested as we live in the same home.    Patient reported they are living in the same household with a COVID-19 positive patient.  Since 2019, Rick and has not had upper respiratory infection or influenza-like illness. Has not been diagnosed with lab-confirmed COVID-19 test   Pertinent medical history  Rick does not need a return to work/school note.   Weight: 210 lbs   Rick does not smoke or use smokeless tobacco.   Additional information as reported by the patient (free text): Would not let me enter my meds in the area provided   Weight: 210 lbs    MEDICATIONS: No current " "medications, ALLERGIES: Aleve  Clinician Response:  Dear Rick,   Your symptoms show that you may have coronavirus (COVID-19). This illness can cause fever, cough and trouble breathing. Many people get a mild case and get better on their own. Some people can get very sick.  What should I do?  We would like to test you for this virus.   1. Please call [phone] to schedule your visit. Explain that you were referred by [facility] to have a COVID-19 test. Be ready to share [facility].  The following will serve as your written order for this COVID Test, ordered by me, for the indication of suspected COVID [Z20.828]: The test will be ordered in mySchoolNotebook, our electronic health record, after you are scheduled. It will show as ordered and authorized by Biju Graham MD.  Order: COVID-19 (Coronavirus) PCR for SYMPTOMATIC testing from [facility].      2. When it's time for your COVID test:  Stay at least 6 feet away from others. (If someone will drive you to your test, stay in the backseat, as far away from the  as you can.)   Cover your mouth and nose with a mask, tissue or washcloth.  Go straight to the testing site. Don't make any stops on the way there or back.      3.Starting now: Stay home and away from others (self-isolate) until:   You've had no fever---and no medicine that reduces fever---for one full day (24 hours). And...   Your other symptoms have gotten better. For example, your cough or breathing has improved. And...   At least 10 days have passed since your symptoms started.       During this time, don't leave the house except for testing or medical care.   Stay in your own room, even for meals. Use your own bathroom if you can.   Stay away from others in your home. No hugging, kissing or shaking hands. No visitors.  Don't go to work, school or anywhere else.    Clean \"high touch\" surfaces often (doorknobs, counters, handles, etc.). Use a household cleaning spray or wipes. You'll find a full list of "  on the EPA website: www.epa.gov/pesticide-registration/list-n-disinfectants-use-against-sars-cov-2.   Cover your mouth and nose with a mask, tissue or washcloth to avoid spreading germs.  Wash your hands and face often. Use soap and water.  Caregivers in these groups are at risk for severe illness due to COVID-19:  o People 65 years and older  o People who live in a nursing home or long-term care facility  o People with chronic disease (lung, heart, cancer, diabetes, kidney, liver, immunologic)  o People who have a weakened immune system, including those who:   Are in cancer treatment  Take medicine that weakens the immune system, such as corticosteroids  Had a bone marrow or organ transplant  Have an immune deficiency  Have poorly controlled HIV or AIDS  Are obese (body mass index of 40 or higher)  Smoke regularly   o Caregivers should wear gloves while washing dishes, handling laundry and cleaning bedrooms and bathrooms.  o Use caution when washing and drying laundry: Don't shake dirty laundry, and use the warmest water setting that you can.  o For more tips, go to www.cdc.gov/coronavirus/2019-ncov/downloads/10Things.pdf.    4.Sign up for [employer]. We know it's scary to hear that you might have COVID-19. We want to track your symptoms to make sure you're okay over the next 2 weeks. Please look for an email from [employer]---this is a free, online program that we'll use to keep in touch. To sign up, follow the link in the email. Learn more at [email or web address]  How can I take care of myself?   Get lots of rest. Drink extra fluids (unless a doctor has told you not to).   Take Tylenol (acetaminophen) for fever or pain. If you have liver or kidney problems, ask your family doctor if it's okay to take Tylenol.   Adults can take either:    650 mg (two 325 mg pills) every 4 to 6 hours, or...   1,000 mg (two 500 mg pills) every 8 hours as needed.    Note: Don't take more than 3,000 mg in one  day. Acetaminophen is found in many medicines (both prescribed and over-the-counter medicines). Read all labels to be sure you don't take too much.   For children, check the Tylenol bottle for the right dose. The dose is based on the child's age or weight.    If you have other health problems (like cancer, heart failure, an organ transplant or severe kidney disease): Call your specialty clinic if you don't feel better in the next 2 days.       Know when to call 911. Emergency warning signs include:    Trouble breathing or shortness of breath Pain or pressure in the chest that doesn't go away Feeling confused like you haven't felt before, or not being able to wake up Bluish-colored lips or face.  Where can I get more information?    [facility] -- About COVID-19: [email or web address]   CDC -- What to Do If You're Sick: www.cdc.gov/coronavirus/2019-ncov/about/steps-when-sick.html   CDC -- Ending Home Isolation: www.cdc.gov/coronavirus/2019-ncov/hcp/disposition-in-home-patients.html   CDC -- Caring for Someone: www.cdc.gov/coronavirus/2019-ncov/if-you-are-sick/care-for-someone.html   [facility] -- Interim Guidance for Hospital Discharge to Home: www.health.[facility].[place]/diseases/coronavirus/hcp/hospdischarge.pdf   [facility] clinical trials (COVID-19 research studies): clinicalaffairs.[facility]-clinical-trials    Below are the COVID-19 hotlines at the [facility] of Health ([facility]). Interpreters are available.    For health questions: Call 875-110-1881 or 1-660.100.3714 (7 a.m. to 7 p.m.) For questions about schools and childcare: Call 847-961-7776 or 1-598.598.2257 (7 a.m. to 7 p.m.)    Diagnosis: Cough  Diagnosis ICD: R05

## 2020-08-15 DIAGNOSIS — Z20.822 ENCOUNTER FOR LABORATORY TESTING FOR COVID-19 VIRUS: Primary | ICD-10-CM

## 2020-08-15 PROCEDURE — U0003 INFECTIOUS AGENT DETECTION BY NUCLEIC ACID (DNA OR RNA); SEVERE ACUTE RESPIRATORY SYNDROME CORONAVIRUS 2 (SARS-COV-2) (CORONAVIRUS DISEASE [COVID-19]), AMPLIFIED PROBE TECHNIQUE, MAKING USE OF HIGH THROUGHPUT TECHNOLOGIES AS DESCRIBED BY CMS-2020-01-R: HCPCS | Performed by: FAMILY MEDICINE

## 2020-08-17 LAB
SARS-COV-2 RNA SPEC QL NAA+PROBE: NOT DETECTED
SPECIMEN SOURCE: NORMAL

## 2020-10-12 ENCOUNTER — HOSPITAL ENCOUNTER (EMERGENCY)
Facility: CLINIC | Age: 75
Discharge: HOME | End: 2020-10-12
Attending: FAMILY MEDICINE | Admitting: FAMILY MEDICINE
Payer: COMMERCIAL

## 2020-10-12 VITALS — TEMPERATURE: 98 F | WEIGHT: 206 LBS | HEART RATE: 92 BPM | RESPIRATION RATE: 18 BRPM | OXYGEN SATURATION: 95 %

## 2020-10-12 DIAGNOSIS — K40.91 UNILATERAL RECURRENT INGUINAL HERNIA WITHOUT OBSTRUCTION OR GANGRENE: ICD-10-CM

## 2020-10-12 PROCEDURE — 99284 EMERGENCY DEPT VISIT MOD MDM: CPT | Performed by: FAMILY MEDICINE

## 2020-10-12 PROCEDURE — 99282 EMERGENCY DEPT VISIT SF MDM: CPT | Performed by: FAMILY MEDICINE

## 2020-10-12 NOTE — ED NOTES
Patient believes that he may have had a failed hernia repair.  States there was bulging with minimal pain yesterday.  Has some bulging today.

## 2020-10-12 NOTE — ED PROVIDER NOTES
HPI   The patient is a 75-year-old male presenting with concern for a recurrent hernia.  He tells me that his right inguinal hernia was repaired about 25-30 years ago.  He reports mesh being used.  The surgery was performed at the [facility].  The patient was sneezing yesterday when he felt something give in the right inguinal region.  This morning he recognized a bulge that does not seem to reduce easily with palpation.  He denies significant pain or tenderness.  He denies overlying skin changes.  He denies a change in appetite, nausea, or vomiting.  He denies having a fever.  No abdominal or pelvic pain.  No back or flank pain.  No dysuria, urgency, or frequency of urination.  No testicular pain, swelling, or tenderness.        Allergies:  Allergies   Allergen Reactions     Aleve Rash     Naproxen Rash     Problem List:    Patient Active Problem List    Diagnosis Date Noted     S/P total knee replacement, right 10/16/2017     Priority: Medium     Sleep apnea 12/21/2012     Priority: Medium     AAA (abdominal aortic aneurysm) (H) 09/02/2010     Priority: Medium     Overview:   3 cm just above bifurcation  Recommend q2-3yr u/s monitoring  No change dec 2014 repeat 2017       Coronary atherosclerosis 07/18/2007     Priority: Medium     Overview:   pos heart scan 1/05  Neg cardiolyte gxt 2003, 1/2008,  Ej fx 57%       Hyperlipidemia 07/18/2007     Priority: Medium     Esophageal reflux 07/18/2007     Priority: Medium     Essential hypertension 07/18/2007     Priority: Medium      Past Medical History:    History reviewed. No pertinent past medical history.  Past Surgical History:    Past Surgical History:   Procedure Laterality Date     COLONOSCOPY  12/20/2010    COLONOSCOPY performed by KURT SCOTT at [facility]     COLONOSCOPY  5/26/2011    Procedure:COLONOSCOPY; Surgeon:KURT SCOTT; Location:[facility]     Family History:    History reviewed. No pertinent family history.  Social History:  Marital  Status:   [2]  Social History     Tobacco Use     Smoking status: Former Smoker     Smokeless tobacco: Never Used   Substance Use Topics     Alcohol use: Yes     Alcohol/week: 12.0 standard drinks     Types: 12 Cans of beer per week     Drug use: Never      Medications:         acetaminophen (TYLENOL) 500 MG tablet       AMOXICILLIN PO       atorvastatin (LIPITOR) 40 MG tablet       ibuprofen (ADVIL,MOTRIN) 600 MG tablet       NIFEdipine ER osmotic (PROCARDIA XL) 60 MG TB24       omeprazole (PRILOSEC) 20 MG CR capsule       OXYCODONE HCL PO       VITAMIN D, CHOLECALCIFEROL, PO      Review of Systems   All other systems reviewed and are negative.      PE   Pulse: 92  Temp: 98  F (36.7  C)  Resp: 18  Weight: 93.4 kg (206 lb)  SpO2: 95 %  Physical Exam  Vitals signs and nursing note reviewed.   Constitutional:       General: He is not in acute distress.  HENT:      Head: Atraumatic.      Right Ear: External ear normal.      Left Ear: External ear normal.      Nose: Nose normal.      Mouth/Throat:      Mouth: Mucous membranes are moist.      Pharynx: Oropharynx is clear.   Eyes:      General: No scleral icterus.     Extraocular Movements: Extraocular movements intact.      Conjunctiva/sclera: Conjunctivae normal.      Pupils: Pupils are equal, round, and reactive to light.   Neck:      Musculoskeletal: Normal range of motion.   Cardiovascular:      Rate and Rhythm: Normal rate.   Pulmonary:      Effort: Pulmonary effort is normal. No respiratory distress.   Abdominal:      Comments: The patient has an obvious inguinal hernia on the right side.  Some mild tenderness as I push forcefully onto the hernia.  It is difficult to reduce the hernia manually.  No overlying skin changes.   Musculoskeletal: Normal range of motion.   Skin:     General: Skin is warm and dry.   Neurological:      Mental Status: He is alert and oriented to person, place, and time.   Psychiatric:         Behavior: Behavior normal.         ED  [facility]   0815.  The patient has an inguinal hernia that is recurrent.  The hernia is difficult to reduce.  He has no pain locally or within the abdomen or pelvis.  He has some tenderness as I push strongly onto the hernia.  No evidence for incarceration or obstruction.  No evidence for infectious complication.  I am suggesting he follow-up with the surgeon in the clinic.  I did offer to schedule an appointment here at our hospital but he would like to follow-up in his home clinic if possible.    LABS  Labs Ordered and Resulted from Time of ED Arrival Up to the Time of Departure from the ED - No data to display    IMAGING  Images reviewed by me.  Radiology report also reviewed.  No orders to display       Procedures    Medications - No data to display      IMPRESSION       ICD-10-CM    1. Unilateral recurrent inguinal hernia without obstruction or gangrene  K40.91             Medication List      There are no discharge medications for this visit.                       Efraín Sparrow MD  10/12/20 0852

## 2020-10-12 NOTE — DISCHARGE INSTRUCTIONS
Return to the Emergency Room if the following occurs:     Severely worsened pain, expanding redness/tenderness of the skin overlying the hernia, vomiting, fever >101, or for any concern at anytime.    Or, follow-up with the following provider as we discussed:     Return to see a general surgeon in the clinic.  Tell them that you were seen in the ER for a recurrent inguinal hernia.    Medications discussed:    None new.  No changes.    If you received pain-relieving or sedating medication during your time in the ER, avoid alcohol, driving automobiles, or working with machinery.  Also, a responsible adult must stay with you.        Call the Nurse Advice Line at [phone] or [phone] for any concern at anytime.

## 2020-10-12 NOTE — ED AVS SNAPSHOT
[facility] Emergency Dept  [address] [address]  Phone: [phone]  Fax: [phone]                                    Rick Rodrigues   MRN: [id number]    Department: [facility] Emergency Dept   Date of Visit: 10/12/2020           After Visit Summary Signature Page    I have received my discharge instructions, and my questions have been answered. I have discussed any challenges I see with this plan with the nurse or doctor.    ..........................................................................................................................................  Patient/Patient Representative Signature      ..........................................................................................................................................  Patient Representative Print Name and Relationship to Patient    ..................................................               ................................................  Date                                   Time    ..........................................................................................................................................  Reviewed by Signature/Title    ...................................................              ..............................................  Date                                               Time          22EPIC Rev 08/18

## 2020-10-14 ENCOUNTER — OFFICE VISIT (OUTPATIENT)
Dept: SURGERY | Facility: CLINIC | Age: 75
End: 2020-10-14
Payer: COMMERCIAL

## 2020-10-14 VITALS
TEMPERATURE: 97.6 F | WEIGHT: 205.91 LBS | SYSTOLIC BLOOD PRESSURE: 129 MMHG | DIASTOLIC BLOOD PRESSURE: 73 MMHG | HEART RATE: 95 BPM | HEIGHT: 70 IN | BODY MASS INDEX: 29.48 KG/M2

## 2020-10-14 DIAGNOSIS — K40.21 BILATERAL RECURRENT INGUINAL HERNIA WITHOUT OBSTRUCTION OR GANGRENE: Primary | ICD-10-CM

## 2020-10-14 DIAGNOSIS — Z11.59 ENCOUNTER FOR SCREENING FOR OTHER VIRAL DISEASES: Primary | ICD-10-CM

## 2020-10-14 PROCEDURE — 99204 OFFICE O/P NEW MOD 45 MIN: CPT | Performed by: SURGERY

## 2020-10-14 ASSESSMENT — MIFFLIN-ST. JEOR: SCORE: 1680.25

## 2020-10-14 NOTE — NURSING NOTE
"Initial /73 (BP Location: Right arm, Patient Position: Sitting, Cuff Size: Adult Regular)   Pulse 95   Temp 97.6  F (36.4  C) (Tympanic)   Ht 1.778 m (5' 10\")   Wt 93.4 kg (205 lb 14.6 oz)   BMI 29.54 kg/m   Estimated body mass index is 29.54 kg/m  as calculated from the following:    Height as of this encounter: 1.778 m (5' 10\").    Weight as of this encounter: 93.4 kg (205 lb 14.6 oz). .    Yuko Varma MA    "

## 2020-10-14 NOTE — LETTER
10/14/2020         RE: Rick Rodrigues  [address] [place] [id number]        Dear Colleague,    Thank you for referring your patient, Rick Rodrigues, to the [facility]. Please see a copy of my visit note below.    75-year-old male who underwent an open right inguinal hernia repair at least 30 years ago.  Patient was doing some heavy lifting recently and felt a sudden pop and another bulge in that right groin.  Is not reducible and causes only minimal discomfort.  He denies fevers chills nausea and vomiting.  No other associated symptoms.  Pain is localized to the right groin 1-2 out of 10 without radiation.    Patient Active Problem List   Diagnosis     AAA (abdominal aortic aneurysm) (H)     Coronary atherosclerosis     Hyperlipidemia     Esophageal reflux     S/P total knee replacement, right     Essential hypertension     Sleep apnea       History reviewed. No pertinent past medical history.    Past Surgical History:   Procedure Laterality Date     AS TOTAL KNEE ARTHROPLASTY Bilateral      COLONOSCOPY  12/20/2010    COLONOSCOPY performed by KURT SCOTT at [facility]     COLONOSCOPY  05/26/2011    Procedure:COLONOSCOPY; Surgeon:KURT SCOTT; Location:[facility]     HERNIORRHAPHY INGUINAL Right 1990    open with mesh       History reviewed. No pertinent family history.    Social History     Tobacco Use     Smoking status: Former Smoker     Smokeless tobacco: Never Used   Substance Use Topics     Alcohol use: Yes     Alcohol/week: 12.0 standard drinks     Types: 12 Cans of beer per week        History   Drug Use Unknown       Current Outpatient Medications   Medication Sig Dispense Refill     acetaminophen (TYLENOL) 500 MG tablet Take 1,000 mg by mouth every 8 hours as needed        AMOXICILLIN PO Take  by mouth.       atorvastatin (LIPITOR) 40 MG tablet Take 40 mg by mouth       ibuprofen (ADVIL,MOTRIN) 600 MG tablet Take 600 mg by mouth every 8 hours as needed   "      NIFEdipine ER osmotic (PROCARDIA XL) 60 MG TB24 Take 60 mg by mouth daily        omeprazole (PRILOSEC) 20 MG CR capsule Take 20 mg by mouth daily        OXYCODONE HCL PO Take 5 mg by mouth every 6 hours as needed       VITAMIN D, CHOLECALCIFEROL, PO Take 2,000 Units by mouth daily         Allergies   Allergen Reactions     Aleve Rash     Naproxen Rash        ROS  Constitutional - Denies fevers, weight loss, malaise, lethargy  Neuro - Denies tremors or seizures  Pulmon - Denies SOB, dyspnea, hemoptysis, chronic cough or use of an inhaler  CV - Denies CP, SOB, lower extremity edema, difficulty w/ stairs, has never used NTG  GI - Denies hematemesis, BRBPR, melena, chronic diarrhea or epigastric pain   - Denies hematuria, difficulty voiding, h/o STDs  Hematology - Denies blood clotting disorders, chronic anemias  Dermatology - No melanomas or skin cancers  Rheumatology - No h/o RA  Pysch - Denies depression, bipolar d/o or schizophrenia    Exam:/73 (BP Location: Right arm, Patient Position: Sitting, Cuff Size: Adult Regular)   Pulse 95   Temp 97.6  F (36.4  C) (Tympanic)   Ht 1.778 m (5' 10\")   Wt 93.4 kg (205 lb 14.6 oz)   BMI 29.54 kg/m      General - Alert and Oriented X4, NAD, well nourished  HEENT - Normocephalic, atraumatic, PERRLA, Nose midline, Throat without lesions  Neck - supple, no LAD  Lungs - Clear to auscultation bilaterally with good inspiratory effort  CV - Heart RRR, no lift's, thrills, murmurs, rubs, or gallops  Abdomen - Soft, non-tender, +BS, no hepatosplenomegaly, no palpable masses  Groins - 2+ pulses bilaterally and no LAD, palpable nonreducible nontender mass in the right groin, palpable strong impulse on left  Neuro - Full ROM, Strength 5/5 and major muscle groups, sensation intact  Extremities - No cyanosis, clubbing or edema    Assessment and plan: 75-year-old male with recurrent right inguinal hernia and small hernia on the left.  Patient is good candidate for laparoscopic " repair.  Risks benefits alternatives and complications were discussed with the patient including the possibility of infection bleeding or hernia recurrence.  Patient understood and wished to proceed.  Patient will need a preoperative exam from his primary care provider and we will go ahead and schedule him for 2 weeks.    Paresh Linares MD       Again, thank you for allowing me to participate in the care of your patient.        Sincerely,        Paresh Linares MD

## 2020-10-14 NOTE — PROGRESS NOTES
75-year-old male who underwent an open right inguinal hernia repair at least 30 years ago.  Patient was doing some heavy lifting recently and felt a sudden pop and another bulge in that right groin.  Is not reducible and causes only minimal discomfort.  He denies fevers chills nausea and vomiting.  No other associated symptoms.  Pain is localized to the right groin 1-2 out of 10 without radiation.    Patient Active Problem List   Diagnosis     AAA (abdominal aortic aneurysm) (H)     Coronary atherosclerosis     Hyperlipidemia     Esophageal reflux     S/P total knee replacement, right     Essential hypertension     Sleep apnea       History reviewed. No pertinent past medical history.    Past Surgical History:   Procedure Laterality Date     AS TOTAL KNEE ARTHROPLASTY Bilateral      COLONOSCOPY  12/20/2010    COLONOSCOPY performed by KURT SCOTT at [facility]     COLONOSCOPY  05/26/2011    Procedure:COLONOSCOPY; Surgeon:KURT SCOTT; Location:[facility]     HERNIORRHAPHY INGUINAL Right 1990    open with mesh       History reviewed. No pertinent family history.    Social History     Tobacco Use     Smoking status: Former Smoker     Smokeless tobacco: Never Used   Substance Use Topics     Alcohol use: Yes     Alcohol/week: 12.0 standard drinks     Types: 12 Cans of beer per week        History   Drug Use Unknown       Current Outpatient Medications   Medication Sig Dispense Refill     acetaminophen (TYLENOL) 500 MG tablet Take 1,000 mg by mouth every 8 hours as needed        AMOXICILLIN PO Take  by mouth.       atorvastatin (LIPITOR) 40 MG tablet Take 40 mg by mouth       ibuprofen (ADVIL,MOTRIN) 600 MG tablet Take 600 mg by mouth every 8 hours as needed        NIFEdipine ER osmotic (PROCARDIA XL) 60 MG TB24 Take 60 mg by mouth daily        omeprazole (PRILOSEC) 20 MG CR capsule Take 20 mg by mouth daily        OXYCODONE HCL PO Take 5 mg by mouth every 6 hours as needed       VITAMIN D, CHOLECALCIFEROL, PO Take 2,000  "Units by mouth daily         Allergies   Allergen Reactions     Aleve Rash     Naproxen Rash        ROS  Constitutional - Denies fevers, weight loss, malaise, lethargy  Neuro - Denies tremors or seizures  Pulmon - Denies SOB, dyspnea, hemoptysis, chronic cough or use of an inhaler  CV - Denies CP, SOB, lower extremity edema, difficulty w/ stairs, has never used NTG  GI - Denies hematemesis, BRBPR, melena, chronic diarrhea or epigastric pain   - Denies hematuria, difficulty voiding, h/o STDs  Hematology - Denies blood clotting disorders, chronic anemias  Dermatology - No melanomas or skin cancers  Rheumatology - No h/o RA  Pysch - Denies depression, bipolar d/o or schizophrenia    Exam:/73 (BP Location: Right arm, Patient Position: Sitting, Cuff Size: Adult Regular)   Pulse 95   Temp 97.6  F (36.4  C) (Tympanic)   Ht 1.778 m (5' 10\")   Wt 93.4 kg (205 lb 14.6 oz)   BMI 29.54 kg/m      General - Alert and Oriented X4, NAD, well nourished  HEENT - Normocephalic, atraumatic, PERRLA, Nose midline, Throat without lesions  Neck - supple, no LAD  Lungs - Clear to auscultation bilaterally with good inspiratory effort  CV - Heart RRR, no lift's, thrills, murmurs, rubs, or gallops  Abdomen - Soft, non-tender, +BS, no hepatosplenomegaly, no palpable masses  Groins - 2+ pulses bilaterally and no LAD, palpable nonreducible nontender mass in the right groin, palpable strong impulse on left  Neuro - Full ROM, Strength 5/5 and major muscle groups, sensation intact  Extremities - No cyanosis, clubbing or edema    Assessment and plan: 75-year-old male with recurrent right inguinal hernia and small hernia on the left.  Patient is good candidate for laparoscopic repair.  Risks benefits alternatives and complications were discussed with the patient including the possibility of infection bleeding or hernia recurrence.  Patient understood and wished to proceed.  Patient will need a preoperative exam from his primary care " provider and we will go ahead and schedule him for 2 weeks.    Paresh Linares MD

## 2020-10-22 RX ORDER — ROSUVASTATIN CALCIUM 10 MG/1
TABLET, COATED ORAL
COMMUNITY
Start: 2020-09-28

## 2020-10-22 RX ORDER — AMOXICILLIN 500 MG/1
CAPSULE ORAL
Status: ON HOLD | COMMUNITY
Start: 2020-09-28 | End: 2022-10-30

## 2020-10-23 DIAGNOSIS — Z11.59 ENCOUNTER FOR SCREENING FOR OTHER VIRAL DISEASES: ICD-10-CM

## 2020-10-23 PROCEDURE — U0003 INFECTIOUS AGENT DETECTION BY NUCLEIC ACID (DNA OR RNA); SEVERE ACUTE RESPIRATORY SYNDROME CORONAVIRUS 2 (SARS-COV-2) (CORONAVIRUS DISEASE [COVID-19]), AMPLIFIED PROBE TECHNIQUE, MAKING USE OF HIGH THROUGHPUT TECHNOLOGIES AS DESCRIBED BY CMS-2020-01-R: HCPCS | Performed by: SURGERY

## 2020-10-24 LAB
SARS-COV-2 RNA SPEC QL NAA+PROBE: NOT DETECTED
SPECIMEN SOURCE: NORMAL

## 2020-10-26 ENCOUNTER — ANESTHESIA EVENT (OUTPATIENT)
Dept: SURGERY | Facility: CLINIC | Age: 75
End: 2020-10-26
Payer: COMMERCIAL

## 2020-10-27 ENCOUNTER — ANESTHESIA (OUTPATIENT)
Dept: SURGERY | Facility: CLINIC | Age: 75
End: 2020-10-27
Payer: COMMERCIAL

## 2020-10-27 ENCOUNTER — HOSPITAL ENCOUNTER (OUTPATIENT)
Facility: CLINIC | Age: 75
Discharge: HOME | End: 2020-10-27
Attending: SURGERY | Admitting: SURGERY
Payer: COMMERCIAL

## 2020-10-27 VITALS
TEMPERATURE: 97.2 F | WEIGHT: 199 LBS | SYSTOLIC BLOOD PRESSURE: 132 MMHG | HEIGHT: 69 IN | RESPIRATION RATE: 16 BRPM | HEART RATE: 89 BPM | DIASTOLIC BLOOD PRESSURE: 81 MMHG | BODY MASS INDEX: 29.47 KG/M2 | OXYGEN SATURATION: 96 %

## 2020-10-27 DIAGNOSIS — G89.18 POSTOPERATIVE PAIN: Primary | ICD-10-CM

## 2020-10-27 DIAGNOSIS — K40.21 BILATERAL RECURRENT INGUINAL HERNIA WITHOUT OBSTRUCTION OR GANGRENE: ICD-10-CM

## 2020-10-27 PROCEDURE — 271N000001 HC OR GENERAL SUPPLY NON-STERILE: Performed by: SURGERY

## 2020-10-27 PROCEDURE — 258N000003 HC RX IP 258 OP 636: Performed by: NURSE ANESTHETIST, CERTIFIED REGISTERED

## 2020-10-27 PROCEDURE — 761N000007 HC RECOVERY PHASE 2 EACH 15 MINS: Performed by: SURGERY

## 2020-10-27 PROCEDURE — 250N000011 HC RX IP 250 OP 636: Performed by: NURSE ANESTHETIST, CERTIFIED REGISTERED

## 2020-10-27 PROCEDURE — 88304 TISSUE EXAM BY PATHOLOGIST: CPT | Mod: TC | Performed by: SURGERY

## 2020-10-27 PROCEDURE — 250N000009 HC RX 250: Performed by: SURGERY

## 2020-10-27 PROCEDURE — 88302 TISSUE EXAM BY PATHOLOGIST: CPT | Mod: 26 | Performed by: PATHOLOGY

## 2020-10-27 PROCEDURE — 360N000020 HC SURGERY LEVEL 3 1ST 30 MIN: Performed by: SURGERY

## 2020-10-27 PROCEDURE — 250N000009 HC RX 250: Performed by: NURSE ANESTHETIST, CERTIFIED REGISTERED

## 2020-10-27 PROCEDURE — 250N000013 HC RX MED GY IP 250 OP 250 PS 637: Performed by: NURSE ANESTHETIST, CERTIFIED REGISTERED

## 2020-10-27 PROCEDURE — 360N000021 HC SURGERY LEVEL 3 EA 15 ADDTL MIN: Performed by: SURGERY

## 2020-10-27 PROCEDURE — 49521 REREPAIR ING HERNIA BLOCKED: CPT | Performed by: SURGERY

## 2020-10-27 PROCEDURE — 250N000003 HC SEVOFLURANE, EA 15 MIN: Performed by: SURGERY

## 2020-10-27 PROCEDURE — 999N000135 HC STATISTIC PRE PROC ASSESS I: Performed by: SURGERY

## 2020-10-27 PROCEDURE — 761N000002 HC RECOVERY PHASE 1 LEVEL 1 EA ADDTL HR: Performed by: SURGERY

## 2020-10-27 PROCEDURE — 761N000001 HC RECOVERY PHASE 1 LEVEL 1 FIRST HR: Performed by: SURGERY

## 2020-10-27 PROCEDURE — 250N000011 HC RX IP 250 OP 636: Performed by: SURGERY

## 2020-10-27 PROCEDURE — 370N000001 HC ANESTHESIA TECHNICAL FEE, 1ST 30 MIN: Performed by: SURGERY

## 2020-10-27 PROCEDURE — 49521 REREPAIR ING HERNIA BLOCKED: CPT | Mod: AS | Performed by: PHYSICIAN ASSISTANT

## 2020-10-27 PROCEDURE — 250N000013 HC RX MED GY IP 250 OP 250 PS 637: Performed by: SURGERY

## 2020-10-27 PROCEDURE — 272N000001 HC OR GENERAL SUPPLY STERILE: Performed by: SURGERY

## 2020-10-27 PROCEDURE — 370N000002 HC ANESTHESIA TECHNICAL FEE, EACH ADDTL 15 MIN: Performed by: SURGERY

## 2020-10-27 PROCEDURE — C1781 MESH (IMPLANTABLE): HCPCS | Performed by: SURGERY

## 2020-10-27 DEVICE — MESH SYMBOTEX COMPOSITE STEX 15CM X 10CM SYM1510: Type: IMPLANTABLE DEVICE | Site: GROIN | Status: FUNCTIONAL

## 2020-10-27 RX ORDER — HYDROCODONE BITARTRATE AND ACETAMINOPHEN 5; 325 MG/1; MG/1
1-2 TABLET ORAL EVERY 6 HOURS PRN
Qty: 20 TABLET | Refills: 0 | Status: ON HOLD | OUTPATIENT
Start: 2020-10-27 | End: 2022-10-30

## 2020-10-27 RX ORDER — FENTANYL CITRATE 50 UG/ML
25-50 INJECTION, SOLUTION INTRAMUSCULAR; INTRAVENOUS EVERY 5 MIN PRN
Status: DISCONTINUED | OUTPATIENT
Start: 2020-10-27 | End: 2020-10-27 | Stop reason: HOSPADM

## 2020-10-27 RX ORDER — ACETAMINOPHEN 325 MG/1
975 TABLET ORAL ONCE
Status: DISCONTINUED | OUTPATIENT
Start: 2020-10-27 | End: 2020-10-27 | Stop reason: HOSPADM

## 2020-10-27 RX ORDER — OXYCODONE HCL 5 MG/5 ML
5 SOLUTION, ORAL ORAL EVERY 4 HOURS PRN
Status: DISCONTINUED | OUTPATIENT
Start: 2020-10-27 | End: 2020-10-27 | Stop reason: HOSPADM

## 2020-10-27 RX ORDER — ONDANSETRON 2 MG/ML
INJECTION INTRAMUSCULAR; INTRAVENOUS PRN
Status: DISCONTINUED | OUTPATIENT
Start: 2020-10-27 | End: 2020-10-27

## 2020-10-27 RX ORDER — SODIUM CHLORIDE, SODIUM LACTATE, POTASSIUM CHLORIDE, CALCIUM CHLORIDE 600; 310; 30; 20 MG/100ML; MG/100ML; MG/100ML; MG/100ML
INJECTION, SOLUTION INTRAVENOUS CONTINUOUS
Status: DISCONTINUED | OUTPATIENT
Start: 2020-10-27 | End: 2020-10-27 | Stop reason: HOSPADM

## 2020-10-27 RX ORDER — CEFAZOLIN SODIUM 2 G/100ML
2 INJECTION, SOLUTION INTRAVENOUS
Status: COMPLETED | OUTPATIENT
Start: 2020-10-27 | End: 2020-10-27

## 2020-10-27 RX ORDER — DEXAMETHASONE SODIUM PHOSPHATE 4 MG/ML
INJECTION, SOLUTION INTRA-ARTICULAR; INTRALESIONAL; INTRAMUSCULAR; INTRAVENOUS; SOFT TISSUE PRN
Status: DISCONTINUED | OUTPATIENT
Start: 2020-10-27 | End: 2020-10-27

## 2020-10-27 RX ORDER — HYDROCODONE BITARTRATE AND ACETAMINOPHEN 5; 325 MG/1; MG/1
1-2 TABLET ORAL EVERY 4 HOURS PRN
Status: DISCONTINUED | OUTPATIENT
Start: 2020-10-27 | End: 2020-10-27 | Stop reason: HOSPADM

## 2020-10-27 RX ORDER — NALOXONE HYDROCHLORIDE 0.4 MG/ML
.1-.4 INJECTION, SOLUTION INTRAMUSCULAR; INTRAVENOUS; SUBCUTANEOUS
Status: DISCONTINUED | OUTPATIENT
Start: 2020-10-27 | End: 2020-10-27 | Stop reason: HOSPADM

## 2020-10-27 RX ORDER — BUPIVACAINE HYDROCHLORIDE AND EPINEPHRINE 2.5; 5 MG/ML; UG/ML
INJECTION, SOLUTION INFILTRATION; PERINEURAL PRN
Status: DISCONTINUED | OUTPATIENT
Start: 2020-10-27 | End: 2020-10-27 | Stop reason: HOSPADM

## 2020-10-27 RX ORDER — ACETAMINOPHEN 325 MG/1
975 TABLET ORAL ONCE
Status: COMPLETED | OUTPATIENT
Start: 2020-10-27 | End: 2020-10-27

## 2020-10-27 RX ORDER — FENTANYL CITRATE 50 UG/ML
25-50 INJECTION, SOLUTION INTRAMUSCULAR; INTRAVENOUS
Status: CANCELLED | OUTPATIENT
Start: 2020-10-27

## 2020-10-27 RX ORDER — HYDRALAZINE HYDROCHLORIDE 20 MG/ML
2.5-5 INJECTION INTRAMUSCULAR; INTRAVENOUS EVERY 10 MIN PRN
Status: DISCONTINUED | OUTPATIENT
Start: 2020-10-27 | End: 2020-10-27 | Stop reason: HOSPADM

## 2020-10-27 RX ORDER — GLYCOPYRROLATE 0.2 MG/ML
INJECTION, SOLUTION INTRAMUSCULAR; INTRAVENOUS PRN
Status: DISCONTINUED | OUTPATIENT
Start: 2020-10-27 | End: 2020-10-27

## 2020-10-27 RX ORDER — ONDANSETRON 2 MG/ML
4 INJECTION INTRAMUSCULAR; INTRAVENOUS EVERY 30 MIN PRN
Status: DISCONTINUED | OUTPATIENT
Start: 2020-10-27 | End: 2020-10-27 | Stop reason: HOSPADM

## 2020-10-27 RX ORDER — FENTANYL CITRATE 50 UG/ML
INJECTION, SOLUTION INTRAMUSCULAR; INTRAVENOUS PRN
Status: DISCONTINUED | OUTPATIENT
Start: 2020-10-27 | End: 2020-10-27

## 2020-10-27 RX ORDER — CEFAZOLIN SODIUM 1 G/50ML
1 INJECTION, SOLUTION INTRAVENOUS SEE ADMIN INSTRUCTIONS
Status: DISCONTINUED | OUTPATIENT
Start: 2020-10-27 | End: 2020-10-27 | Stop reason: HOSPADM

## 2020-10-27 RX ORDER — PROPOFOL 10 MG/ML
INJECTION, EMULSION INTRAVENOUS PRN
Status: DISCONTINUED | OUTPATIENT
Start: 2020-10-27 | End: 2020-10-27

## 2020-10-27 RX ORDER — LIDOCAINE 40 MG/G
CREAM TOPICAL
Status: DISCONTINUED | OUTPATIENT
Start: 2020-10-27 | End: 2020-10-27 | Stop reason: HOSPADM

## 2020-10-27 RX ORDER — LIDOCAINE HYDROCHLORIDE 10 MG/ML
INJECTION, SOLUTION INFILTRATION; PERINEURAL PRN
Status: DISCONTINUED | OUTPATIENT
Start: 2020-10-27 | End: 2020-10-27

## 2020-10-27 RX ORDER — ONDANSETRON 4 MG/1
4 TABLET, ORALLY DISINTEGRATING ORAL EVERY 30 MIN PRN
Status: DISCONTINUED | OUTPATIENT
Start: 2020-10-27 | End: 2020-10-27 | Stop reason: HOSPADM

## 2020-10-27 RX ORDER — GABAPENTIN 300 MG/1
300 CAPSULE ORAL ONCE
Status: COMPLETED | OUTPATIENT
Start: 2020-10-27 | End: 2020-10-27

## 2020-10-27 RX ADMIN — PROPOFOL 20 MG: 10 INJECTION, EMULSION INTRAVENOUS at 13:31

## 2020-10-27 RX ADMIN — HYDROCODONE BITARTRATE AND ACETAMINOPHEN 1 TABLET: 5; 325 TABLET ORAL at 14:40

## 2020-10-27 RX ADMIN — ONDANSETRON 4 MG: 2 INJECTION INTRAMUSCULAR; INTRAVENOUS at 15:05

## 2020-10-27 RX ADMIN — ROCURONIUM BROMIDE 40 MG: 10 INJECTION INTRAVENOUS at 12:13

## 2020-10-27 RX ADMIN — LIDOCAINE HYDROCHLORIDE 0.1 ML: 10 INJECTION, SOLUTION EPIDURAL; INFILTRATION; INTRACAUDAL; PERINEURAL at 11:54

## 2020-10-27 RX ADMIN — GABAPENTIN 300 MG: 300 CAPSULE ORAL at 11:49

## 2020-10-27 RX ADMIN — GLYCOPYRROLATE 0.2 MG: 0.2 INJECTION, SOLUTION INTRAMUSCULAR; INTRAVENOUS at 12:13

## 2020-10-27 RX ADMIN — FENTANYL CITRATE 25 MCG: 50 INJECTION, SOLUTION INTRAMUSCULAR; INTRAVENOUS at 14:04

## 2020-10-27 RX ADMIN — DEXAMETHASONE SODIUM PHOSPHATE 8 MG: 4 INJECTION, SOLUTION INTRA-ARTICULAR; INTRALESIONAL; INTRAMUSCULAR; INTRAVENOUS; SOFT TISSUE at 12:23

## 2020-10-27 RX ADMIN — PROPOFOL 100 MG: 10 INJECTION, EMULSION INTRAVENOUS at 12:13

## 2020-10-27 RX ADMIN — ONDANSETRON 4 MG: 2 INJECTION INTRAMUSCULAR; INTRAVENOUS at 12:23

## 2020-10-27 RX ADMIN — CEFAZOLIN SODIUM 2 G: 2 INJECTION, SOLUTION INTRAVENOUS at 12:22

## 2020-10-27 RX ADMIN — FENTANYL CITRATE 25 MCG: 50 INJECTION, SOLUTION INTRAMUSCULAR; INTRAVENOUS at 13:56

## 2020-10-27 RX ADMIN — SODIUM CHLORIDE, POTASSIUM CHLORIDE, SODIUM LACTATE AND CALCIUM CHLORIDE: 600; 310; 30; 20 INJECTION, SOLUTION INTRAVENOUS at 11:53

## 2020-10-27 RX ADMIN — SODIUM CHLORIDE, POTASSIUM CHLORIDE, SODIUM LACTATE AND CALCIUM CHLORIDE: 600; 310; 30; 20 INJECTION, SOLUTION INTRAVENOUS at 12:09

## 2020-10-27 RX ADMIN — ACETAMINOPHEN 975 MG: 325 TABLET, FILM COATED ORAL at 11:49

## 2020-10-27 RX ADMIN — LIDOCAINE HYDROCHLORIDE 50 MG: 10 INJECTION, SOLUTION INFILTRATION; PERINEURAL at 12:13

## 2020-10-27 RX ADMIN — FENTANYL CITRATE 50 MCG: 50 INJECTION, SOLUTION INTRAMUSCULAR; INTRAVENOUS at 12:13

## 2020-10-27 RX ADMIN — FENTANYL CITRATE 50 MCG: 50 INJECTION, SOLUTION INTRAMUSCULAR; INTRAVENOUS at 13:15

## 2020-10-27 SDOH — HEALTH STABILITY: MENTAL HEALTH: CURRENT SMOKER: 0

## 2020-10-27 ASSESSMENT — ENCOUNTER SYMPTOMS: DYSRHYTHMIAS: 1

## 2020-10-27 ASSESSMENT — MIFFLIN-ST. JEOR: SCORE: 1633.04

## 2020-10-27 NOTE — OP NOTE
Preop diagnosis: Recurrent right inguinal hernia     Postop diagnosis: Recurrent incarcerated right inguinal hernia    Procedure: Laparoscopic assisted open recurrent incarcerated right inguinal hernia repair    Surgeon: Vijay    Assistant surgeon: Felipe Li PA-C (needed for expertise in retraction hemostasis wound closure and suctioning)    Anesthesia: General endotracheal [place] CRNA    Procedure: Patient's lower abdomen was clipped of extraneous hair and cleaned and draped in sterile manner.  1/4% Marcaine with epinephrine was used to anesthetize all port sites.  Small subumbilical curvilinear incision made and subcutaneous tissues dissected to fascia.  Anterior fascia of left rectus muscle open revealing muscle beneath.  This was pulled laterally revealing posterior fascia.  Dissecting balloon trocar inserted into preperitoneal space and insufflated under direct vision.  This was removed and 10 mm trocar placed into preperitoneal space.  Carbon dioxide insufflated to 15 mmHg.  Under direct vision, 2 midline 5 mm trochars also placed.  Patient placed into Trendelenburg position.  Attention was turned to the midline.  The loose areolar tissue covering the pubic arch was  revealing the bony structure.  This dissection continued laterally to the right pelvic sidewall musculature and then down to the psoas muscle.  The hernia was direct and incarcerated.  The tissues were surrounding this but it was necessary to open up the peritoneum in order to retrieve the incarcerated omentum.  This was pulled from Hesselbach's triangle along with all other residual tissue that could be located.  Still, there felt to be a palpable mass in the patient's right groin that could be explained.  Regardless the entire direct and indirect area was cleared of tissue and no additional residual tissue could be located.  A piece of Covidien 10 x 15 mm coated mesh was then used for repair.  It was tacked to the pubic tubercle  and anteriorly across the posterior rectus muscle.  The inferior margin mesh tucked under the parietal peritoneum.  This completely obliterated the indirect and direct defects.  There is still felt to be some incarcerated material which was probably amputated during the initial dissection and remained outside the muscle layer.  Once the mesh was then placed on the inside though all trochars removed under direct vision the air allowed to desufflate.  The fascial defect of the subumbilical port was closed using an 0 Vicryl suture in a figure-of-eight fashion and this was injected with Marcaine.  The skin was prepped overlying the patient's right groin lump.  A 10 cm curvilinear incision was made and subcutaneous tissues dissected to the incarcerated omentum.  This is been amputated from the inside but the remaining omentum remained on the outside.  The hernia sac was opened up in the omentum was excised and sent to pathology along with the sac itself.  The remaining deep muscle defect was closed using an 0 Vicryl suture in a running fashion.  When complete the muscle defect on the outside was closed and the mesh closed the defect on the inside.  All wounds were irrigated with normal saline.  Subcutaneous tissues were approximated using deep dermal 3-0 Vicryl sutures and a 4-0 Vicryl running subcuticular stitches.  All wounds were then dressed with Dermabond.    Estimated blood loss: 10 mL    IV fluid: 800 mL

## 2020-10-27 NOTE — ANESTHESIA CARE TRANSFER NOTE
Patient: Rick Rodrigues    Procedure(s):  RIGHT HERNIORRHAPHY, INGUINAL LAPAROSCOPIC ASSISTED OPEN WITH MESH    Diagnosis: Bilateral recurrent inguinal hernia without obstruction or gangrene [K40.21]  Diagnosis Additional Information: No value filed.    Anesthesia Type:   No value filed.     Note:  Airway :Face Mask  Patient transferred to:PACU  Handoff Report: Identifed the Patient, Identified the Reponsible Provider, Reviewed the pertinent medical history, Discussed the surgical course, Reviewed Intra-OP anesthesia mangement and issues during anesthesia, Set expectations for post-procedure period and Allowed opportunity for questions and acknowledgement of understanding      Vitals: (Last set prior to Anesthesia Care Transfer)    CRNA VITALS  10/27/2020 1319 - 10/27/2020 1357      10/27/2020             Pulse:  119    SpO2:  94 %    Resp Rate (observed):  (!) 4                Electronically Signed By: JANEE Osorio CRNA  October 27, 2020  1:57 PM

## 2020-10-27 NOTE — ANESTHESIA PROCEDURE NOTES
Airway   Date/Time: 10/27/2020 12:20 PM   Patient location during procedure: OR    Staff -   CRNA: Rashad Rodriguez APRN CRNA  Performed By: CRNA    Consent for Airway   Urgency: elective    Indications and Patient Condition  Indications for airway management: krystal-procedural and airway protection  Induction type:intravenousMask difficulty assessment: 1 - vent by mask    Final Airway Details  Final airway type: endotracheal airway  Successful airway:ETT - single  Endotracheal Airway Details   ETT size (mm): 7.5  Cuffed: yes  Cuff volume (mL): 9  Successful intubation technique: video laryngoscopy  Grade View of Cords: 2  Adjucts: stylet  Measured from: gums/teeth  Secured at (cm): 23  Secured with: silk tape  Bite block used: Oral Airway    Post intubation assessment   Placement verified by: capnometry, equal breath sounds and chest rise   Number of attempts at approach: 1  Secured with:silk tape  Ease of procedure: easy  Dentition: Intact and Unchanged

## 2020-10-27 NOTE — ANESTHESIA POSTPROCEDURE EVALUATION
Patient: Rick Rodrigues    Procedure(s):  RIGHT HERNIORRHAPHY, INGUINAL LAPAROSCOPIC ASSISTED OPEN WITH MESH    Diagnosis:Bilateral recurrent inguinal hernia without obstruction or gangrene [K40.21]  Diagnosis Additional Information: No value filed.    Anesthesia Type:  No value filed.    Note:  Anesthesia Post Evaluation    Patient location during evaluation: Bedside and Phase 2  Patient participation: Able to fully participate in evaluation  Level of consciousness: awake and alert  Pain management: adequate  Airway patency: patent  Cardiovascular status: acceptable  Respiratory status: acceptable  Hydration status: acceptable  PONV: none     Anesthetic complications: None          Last vitals:  Vitals:    10/27/20 1500 10/27/20 1515 10/27/20 1530   BP: 115/81 134/86 121/84   Pulse: 90 90 89   Resp: 10 17 15   Temp:  36.4  C (97.5  F)    SpO2: 95% 95% 95%         Electronically Signed By: Sincere Ceja CRNA, APRN CRNA  October 27, 2020  4:06 PM

## 2020-10-27 NOTE — ANESTHESIA PREPROCEDURE EVALUATION
Anesthesia Pre-Procedure Evaluation    Patient: Rick Rodrigues   MRN: [id number] : 1945          Preoperative Diagnosis: Bilateral recurrent inguinal hernia without obstruction or gangrene [K40.21]    Procedure(s):  HERNIORRHAPHY, INGUINAL, BILATERAL, LAPAROSCOPIC    No past medical history on file.  Past Surgical History:   Procedure Laterality Date     AS TOTAL KNEE ARTHROPLASTY Bilateral      COLONOSCOPY  2010    COLONOSCOPY performed by KURT SCOTT at [facility]     COLONOSCOPY  2011    Procedure:COLONOSCOPY; Surgeon:KURT SCOTT; Location:[facility]     HERNIORRHAPHY INGUINAL Right 1990    open with mesh       Anesthesia Evaluation     . Pt has had prior anesthetic. Type: General and MAC           ROS/MED HX    ENT/Pulmonary:     (+)sleep apnea, , . .    Neurologic:  - neg neurologic ROS     Cardiovascular:     (+) hypertension--CAD, --. : . . . :. dysrhythmias Other, . Previous cardiac testing date:results:date: results:ECG reviewed date:2017 results:Sinus Tachycardia -Short RI syndrome   Conrado = 108  -Old anterior infarct.    - Nonspecific T-abnormality.     ABNORMAL    date: results:          METS/Exercise Tolerance:  4 - Raking leaves, gardening   Hematologic:         Musculoskeletal:   (+)  other musculoskeletal- Right total knee replacement      GI/Hepatic:     (+) GERD Asymptomatic on medication,       Renal/Genitourinary:  - ROS Renal section negative       Endo:  - neg endo ROS       Psychiatric:  - neg psychiatric ROS       Infectious Disease:  - neg infectious disease ROS       Malignancy:      - no malignancy   Other:                          Physical Exam  Normal systems: pulmonary and dental    Airway   Mallampati: II  TM distance: >3 FB  Neck ROM: full    Dental     Cardiovascular   Rhythm and rate: regular and normal      Pulmonary    breath sounds clear to auscultation            Lab Results   Component Value Date    WBC 12.9 (H) 11/10/2017    HGB 13.1 (L) 11/10/2017  "   HCT 39.7 (L) 11/10/2017     11/10/2017     11/10/2017    POTASSIUM 3.7 11/10/2017    CHLORIDE 103 11/10/2017    CO2 27 11/10/2017    BUN 13 11/10/2017    CR 0.80 11/10/2017     (H) 11/10/2017    ANIA 8.5 11/10/2017    ALBUMIN 4.3 05/06/2011    PROTTOTAL 7.0 05/06/2011    ALT 24 05/06/2011    AST 26 05/06/2011    ALKPHOS 88 05/06/2011    BILITOTAL 0.5 05/06/2011    LIPASE 63 05/06/2011    INR 1.14 11/10/2017    TSH 0.86 11/10/2017       Preop Vitals  BP Readings from Last 3 Encounters:   10/14/20 129/73   11/10/17 98/73   05/26/11 123/84    Pulse Readings from Last 3 Encounters:   10/14/20 95   10/12/20 92   11/10/17 115      Resp Readings from Last 3 Encounters:   10/12/20 18   11/10/17 18   05/26/11 16    SpO2 Readings from Last 3 Encounters:   10/12/20 95%   11/10/17 92%   05/26/11 93%      Temp Readings from Last 1 Encounters:   10/14/20 36.4  C (97.6  F) (Tympanic)    Ht Readings from Last 1 Encounters:   10/14/20 1.778 m (5' 10\")      Wt Readings from Last 1 Encounters:   10/14/20 93.4 kg (205 lb 14.6 oz)    Estimated body mass index is 29.54 kg/m  as calculated from the following:    Height as of 10/14/20: 1.778 m (5' 10\").    Weight as of 10/14/20: 93.4 kg (205 lb 14.6 oz).       Anesthesia Plan      History & Physical Review  History and physical reviewed and following examination; no interval change.    ASA Status:  2 .    NPO Status:  > 8 hours    Plan for General with Intravenous induction. Maintenance will be Balanced.    PONV prophylaxis:  Ondansetron (or other 5HT-3) and Dexamethasone or Solumedrol  Additional equipment: Videolaryngoscope   The patient is not a current smoker      Postoperative Care  Postoperative pain management:  IV analgesics and Oral pain medications.      Consents  Anesthetic plan, risks, benefits and alternatives discussed with:  Patient and Spouse.  Use of blood products discussed: No .   .                 JANEE Osorio CRNA  "

## 2020-10-28 ENCOUNTER — TELEPHONE (OUTPATIENT)
Dept: SURGERY | Facility: CLINIC | Age: 75
End: 2020-10-28

## 2020-10-28 NOTE — TELEPHONE ENCOUNTER
I left a message for Rick that we were trying to return his call. Rick had a Lap assisted open with mesh right inguinal herniorrhaphy yesterday with Dr Linares. It sounds like he may have a seroma. Chiquita LIRA Rn

## 2020-10-28 NOTE — TELEPHONE ENCOUNTER
Reason for Call:  Other call back    Detailed comments: pt calling stating he had surgery on right groin area to remove scar tissue. He rubbed this area this morning and it sounds like there is liquid in a bag behind the incision site. Wondering what might be the cause of this?    Phone Number Patient can be reached at: Home number on file [phone] (home)    Best Time: any     Can we leave a detailed message on this number? YES    Call taken on 10/28/2020 at 2:13 PM by Marina Villa

## 2020-10-29 LAB — COPATH REPORT: NORMAL

## 2020-11-04 ENCOUNTER — OFFICE VISIT (OUTPATIENT)
Dept: SURGERY | Facility: CLINIC | Age: 75
End: 2020-11-04
Payer: COMMERCIAL

## 2020-11-04 VITALS
HEART RATE: 85 BPM | HEIGHT: 69 IN | SYSTOLIC BLOOD PRESSURE: 111 MMHG | WEIGHT: 199.08 LBS | TEMPERATURE: 97.5 F | DIASTOLIC BLOOD PRESSURE: 73 MMHG | BODY MASS INDEX: 29.49 KG/M2

## 2020-11-04 DIAGNOSIS — Z09 POSTOP CHECK: Primary | ICD-10-CM

## 2020-11-04 PROCEDURE — 99024 POSTOP FOLLOW-UP VISIT: CPT | Performed by: SURGERY

## 2020-11-04 ASSESSMENT — MIFFLIN-ST. JEOR: SCORE: 1633.38

## 2020-11-04 NOTE — NURSING NOTE
"Initial /73 (BP Location: Right arm, Patient Position: Sitting, Cuff Size: Adult Large)   Pulse 85   Temp 97.5  F (36.4  C) (Tympanic)   Ht 1.753 m (5' 9\")   Wt 90.3 kg (199 lb 1.2 oz)   BMI 29.40 kg/m   Estimated body mass index is 29.4 kg/m  as calculated from the following:    Height as of this encounter: 1.753 m (5' 9\").    Weight as of this encounter: 90.3 kg (199 lb 1.2 oz). .    Yuko Varma MA    "

## 2020-11-04 NOTE — LETTER
"    11/4/2020         RE: Rick Rodrigues  [address] [place] [id number]        Dear Colleague,    Thank you for referring your patient, Rick Rodrigues, to the [facility]. Please see a copy of my visit note below.    No complaints.  Pain minimal.    /73 (BP Location: Right arm, Patient Position: Sitting, Cuff Size: Adult Large)   Pulse 85   Temp 97.5  F (36.4  C) (Tympanic)   Ht 1.753 m (5' 9\")   Wt 90.3 kg (199 lb 1.2 oz)   BMI 29.40 kg/m      Exam  [id number]  CTAB  RRR  S&NTND+BS, wounds - cdi s erythema  No CCE    A/P s/p laparoscopic right inguinal hernia repair healing well.  No heavy lifting for 2 weeks.  RTC prn.    Paresh Linares MD       Again, thank you for allowing me to participate in the care of your patient.        Sincerely,        Paresh Linares MD    "

## 2020-11-04 NOTE — PROGRESS NOTES
"No complaints.  Pain minimal.    /73 (BP Location: Right arm, Patient Position: Sitting, Cuff Size: Adult Large)   Pulse 85   Temp 97.5  F (36.4  C) (Tympanic)   Ht 1.753 m (5' 9\")   Wt 90.3 kg (199 lb 1.2 oz)   BMI 29.40 kg/m      Exam  [id number]  CTAB  RRR  S&NTND+BS, wounds - cdi s erythema  No CCE    A/P s/p laparoscopic right inguinal hernia repair healing well.  No heavy lifting for 2 weeks.  RTC prn.    Paresh Linares MD   "

## 2020-11-16 ENCOUNTER — HEALTH MAINTENANCE LETTER (OUTPATIENT)
Age: 75
End: 2020-11-16

## 2020-11-24 ENCOUNTER — DOCUMENTATION ONLY (OUTPATIENT)
Dept: OTHER | Facility: CLINIC | Age: 75
End: 2020-11-24

## 2020-11-24 ENCOUNTER — AMBULATORY - HEALTHEAST (OUTPATIENT)
Dept: OTHER | Facility: CLINIC | Age: 75
End: 2020-11-24

## 2021-05-28 ENCOUNTER — RECORDS - HEALTHEAST (OUTPATIENT)
Dept: ADMINISTRATIVE | Facility: CLINIC | Age: 76
End: 2021-05-28

## 2021-09-18 ENCOUNTER — HEALTH MAINTENANCE LETTER (OUTPATIENT)
Age: 76
End: 2021-09-18

## 2022-01-08 ENCOUNTER — HEALTH MAINTENANCE LETTER (OUTPATIENT)
Age: 77
End: 2022-01-08

## 2022-10-29 ENCOUNTER — HOSPITAL ENCOUNTER (OUTPATIENT)
Facility: CLINIC | Age: 77
Setting detail: OBSERVATION
Discharge: HOME | End: 2022-10-30
Attending: EMERGENCY MEDICINE | Admitting: INTERNAL MEDICINE
Payer: COMMERCIAL

## 2022-10-29 DIAGNOSIS — K92.2 GASTROINTESTINAL HEMORRHAGE, UNSPECIFIED GASTROINTESTINAL HEMORRHAGE TYPE: ICD-10-CM

## 2022-10-29 DIAGNOSIS — R55 VASOVAGAL SYNCOPE: ICD-10-CM

## 2022-10-29 DIAGNOSIS — K92.2 ACUTE GASTROINTESTINAL HEMORRHAGE: ICD-10-CM

## 2022-10-29 DIAGNOSIS — Z11.52 ENCOUNTER FOR SCREENING LABORATORY TESTING FOR SEVERE ACUTE RESPIRATORY SYNDROME CORONAVIRUS 2 (SARS-COV-2): ICD-10-CM

## 2022-10-29 LAB
ABO/RH(D): NORMAL
ALBUMIN SERPL BCG-MCNC: 3.9 G/DL (ref 3.5–5.2)
ALP SERPL-CCNC: 76 U/L (ref 40–129)
ALT SERPL W P-5'-P-CCNC: 16 U/L (ref 10–50)
ANION GAP SERPL CALCULATED.3IONS-SCNC: 14 MMOL/L (ref 7–15)
ANTIBODY SCREEN: NEGATIVE
AST SERPL W P-5'-P-CCNC: 19 U/L (ref 10–50)
BASOPHILS # BLD AUTO: 0.1 10E3/UL (ref 0–0.2)
BASOPHILS NFR BLD AUTO: 1 %
BILIRUB SERPL-MCNC: 0.6 MG/DL
BUN SERPL-MCNC: 16.1 MG/DL (ref 8–23)
CALCIUM SERPL-MCNC: 8.8 MG/DL (ref 8.8–10.2)
CHLORIDE SERPL-SCNC: 104 MMOL/L (ref 98–107)
CREAT SERPL-MCNC: 0.74 MG/DL (ref 0.67–1.17)
DEPRECATED HCO3 PLAS-SCNC: 23 MMOL/L (ref 22–29)
EOSINOPHIL # BLD AUTO: 0.3 10E3/UL (ref 0–0.7)
EOSINOPHIL NFR BLD AUTO: 2 %
ERYTHROCYTE [DISTWIDTH] IN BLOOD BY AUTOMATED COUNT: 12.5 % (ref 10–15)
GFR SERPL CREATININE-BSD FRML MDRD: >90 ML/MIN/1.73M2
GLUCOSE SERPL-MCNC: 105 MG/DL (ref 70–99)
HCT VFR BLD AUTO: 38.2 % (ref 40–53)
HGB BLD-MCNC: 12.9 G/DL (ref 13.3–17.7)
HOLD SPECIMEN: NORMAL
IMM GRANULOCYTES # BLD: 0 10E3/UL
IMM GRANULOCYTES NFR BLD: 0 %
LYMPHOCYTES # BLD AUTO: 2.1 10E3/UL (ref 0.8–5.3)
LYMPHOCYTES NFR BLD AUTO: 18 %
MCH RBC QN AUTO: 32.6 PG (ref 26.5–33)
MCHC RBC AUTO-ENTMCNC: 33.8 G/DL (ref 31.5–36.5)
MCV RBC AUTO: 97 FL (ref 78–100)
MONOCYTES # BLD AUTO: 1 10E3/UL (ref 0–1.3)
MONOCYTES NFR BLD AUTO: 9 %
NEUTROPHILS # BLD AUTO: 8.1 10E3/UL (ref 1.6–8.3)
NEUTROPHILS NFR BLD AUTO: 70 %
NRBC # BLD AUTO: 0 10E3/UL
NRBC BLD AUTO-RTO: 0 /100
PLATELET # BLD AUTO: 246 10E3/UL (ref 150–450)
POTASSIUM SERPL-SCNC: 3.6 MMOL/L (ref 3.4–5.3)
PROT SERPL-MCNC: 6.4 G/DL (ref 6.4–8.3)
RBC # BLD AUTO: 3.96 10E6/UL (ref 4.4–5.9)
SODIUM SERPL-SCNC: 141 MMOL/L (ref 136–145)
SPECIMEN EXPIRATION DATE: NORMAL
TROPONIN T SERPL HS-MCNC: 16 NG/L
WBC # BLD AUTO: 11.5 10E3/UL (ref 4–11)

## 2022-10-29 PROCEDURE — 258N000003 HC RX IP 258 OP 636: Performed by: EMERGENCY MEDICINE

## 2022-10-29 PROCEDURE — C9113 INJ PANTOPRAZOLE SODIUM, VIA: HCPCS | Performed by: EMERGENCY MEDICINE

## 2022-10-29 PROCEDURE — 85014 HEMATOCRIT: CPT | Performed by: EMERGENCY MEDICINE

## 2022-10-29 PROCEDURE — 93005 ELECTROCARDIOGRAM TRACING: CPT | Performed by: EMERGENCY MEDICINE

## 2022-10-29 PROCEDURE — 76775 US EXAM ABDO BACK WALL LIM: CPT | Mod: 26 | Performed by: EMERGENCY MEDICINE

## 2022-10-29 PROCEDURE — 86901 BLOOD TYPING SEROLOGIC RH(D): CPT | Performed by: EMERGENCY MEDICINE

## 2022-10-29 PROCEDURE — 99285 EMERGENCY DEPT VISIT HI MDM: CPT | Mod: 25 | Performed by: EMERGENCY MEDICINE

## 2022-10-29 PROCEDURE — G0378 HOSPITAL OBSERVATION PER HR: HCPCS

## 2022-10-29 PROCEDURE — 82040 ASSAY OF SERUM ALBUMIN: CPT | Performed by: EMERGENCY MEDICINE

## 2022-10-29 PROCEDURE — 96374 THER/PROPH/DIAG INJ IV PUSH: CPT | Performed by: EMERGENCY MEDICINE

## 2022-10-29 PROCEDURE — 250N000011 HC RX IP 250 OP 636: Performed by: EMERGENCY MEDICINE

## 2022-10-29 PROCEDURE — C9803 HOPD COVID-19 SPEC COLLECT: HCPCS | Performed by: EMERGENCY MEDICINE

## 2022-10-29 PROCEDURE — 80053 COMPREHEN METABOLIC PANEL: CPT | Performed by: EMERGENCY MEDICINE

## 2022-10-29 PROCEDURE — 86850 RBC ANTIBODY SCREEN: CPT | Performed by: EMERGENCY MEDICINE

## 2022-10-29 PROCEDURE — 84484 ASSAY OF TROPONIN QUANT: CPT | Performed by: EMERGENCY MEDICINE

## 2022-10-29 PROCEDURE — 76775 US EXAM ABDO BACK WALL LIM: CPT | Performed by: EMERGENCY MEDICINE

## 2022-10-29 PROCEDURE — 93010 ELECTROCARDIOGRAM REPORT: CPT | Performed by: EMERGENCY MEDICINE

## 2022-10-29 PROCEDURE — 87635 SARS-COV-2 COVID-19 AMP PRB: CPT | Performed by: EMERGENCY MEDICINE

## 2022-10-29 PROCEDURE — 36415 COLL VENOUS BLD VENIPUNCTURE: CPT | Performed by: EMERGENCY MEDICINE

## 2022-10-29 RX ADMIN — SODIUM CHLORIDE, POTASSIUM CHLORIDE, SODIUM LACTATE AND CALCIUM CHLORIDE 1000 ML: 600; 310; 30; 20 INJECTION, SOLUTION INTRAVENOUS at 23:44

## 2022-10-29 RX ADMIN — PANTOPRAZOLE SODIUM 40 MG: 40 INJECTION, POWDER, FOR SOLUTION INTRAVENOUS at 21:41

## 2022-10-29 ASSESSMENT — ACTIVITIES OF DAILY LIVING (ADL): ADLS_ACUITY_SCORE: 35

## 2022-10-30 VITALS
HEIGHT: 68 IN | SYSTOLIC BLOOD PRESSURE: 133 MMHG | WEIGHT: 207.45 LBS | OXYGEN SATURATION: 96 % | DIASTOLIC BLOOD PRESSURE: 82 MMHG | RESPIRATION RATE: 18 BRPM | BODY MASS INDEX: 31.44 KG/M2 | TEMPERATURE: 97.8 F | HEART RATE: 76 BPM

## 2022-10-30 PROBLEM — R55 VASOVAGAL SYNCOPE: Status: ACTIVE | Noted: 2022-10-30

## 2022-10-30 PROBLEM — K92.2 LOWER GI BLEED: Status: ACTIVE | Noted: 2022-10-30

## 2022-10-30 LAB
ERYTHROCYTE [DISTWIDTH] IN BLOOD BY AUTOMATED COUNT: 12.7 % (ref 10–15)
HCT VFR BLD AUTO: 33 % (ref 40–53)
HGB BLD-MCNC: 11 G/DL (ref 13.3–17.7)
HOLD SPECIMEN: NORMAL
MCH RBC QN AUTO: 32.8 PG (ref 26.5–33)
MCHC RBC AUTO-ENTMCNC: 33.3 G/DL (ref 31.5–36.5)
MCV RBC AUTO: 99 FL (ref 78–100)
PLATELET # BLD AUTO: 194 10E3/UL (ref 150–450)
RBC # BLD AUTO: 3.35 10E6/UL (ref 4.4–5.9)
SARS-COV-2 RNA RESP QL NAA+PROBE: NEGATIVE
WBC # BLD AUTO: 7.9 10E3/UL (ref 4–11)

## 2022-10-30 PROCEDURE — 85027 COMPLETE CBC AUTOMATED: CPT | Performed by: INTERNAL MEDICINE

## 2022-10-30 PROCEDURE — 99207 PR APP CREDIT; MD BILLING SHARED VISIT: CPT | Performed by: INTERNAL MEDICINE

## 2022-10-30 PROCEDURE — 99235 HOSP IP/OBS SAME DATE MOD 70: CPT | Performed by: INTERNAL MEDICINE

## 2022-10-30 PROCEDURE — 36415 COLL VENOUS BLD VENIPUNCTURE: CPT | Performed by: INTERNAL MEDICINE

## 2022-10-30 PROCEDURE — G0378 HOSPITAL OBSERVATION PER HR: HCPCS

## 2022-10-30 RX ORDER — NIFEDIPINE 30 MG/1
60 TABLET, EXTENDED RELEASE ORAL EVERY EVENING
Status: DISCONTINUED | OUTPATIENT
Start: 2022-10-30 | End: 2022-10-30 | Stop reason: HOSPADM

## 2022-10-30 RX ORDER — PANTOPRAZOLE SODIUM 40 MG/1
40 TABLET, DELAYED RELEASE ORAL EVERY EVENING
Status: DISCONTINUED | OUTPATIENT
Start: 2022-10-30 | End: 2022-10-30 | Stop reason: HOSPADM

## 2022-10-30 RX ORDER — ROSUVASTATIN CALCIUM 5 MG/1
10 TABLET, COATED ORAL AT BEDTIME
Status: DISCONTINUED | OUTPATIENT
Start: 2022-10-30 | End: 2022-10-30 | Stop reason: HOSPADM

## 2022-10-30 ASSESSMENT — ACTIVITIES OF DAILY LIVING (ADL)
ADLS_ACUITY_SCORE: 22

## 2022-10-30 NOTE — PLAN OF CARE
[facility] DISCHARGE NOTE    Patient discharged to home at 12:22 PM via wheel chair. Accompanied by staff. Discharge instructions reviewed with patient, opportunity offered to ask questions. Prescriptions - None ordered for discharge. All belongings sent with patient.    Sidney Denney RN

## 2022-10-30 NOTE — DISCHARGE SUMMARY
[facility]  Hospitalist Discharge Summary       Date of Admission:  10/29/2022  Date of Discharge:  10/30/2022 12:24 PM  Discharging Provider: Felipe Trevino MD      Discharge Diagnoses     Lower GI bleed, suspect diverticular  Syncope, suspect vasovagal event  AAA (abdominal aortic aneurysm), known  Coronary atherosclerosis?  Hyperlipidemia  Essential hypertension  COVID-19 status negative    Follow-ups Needed After Discharge   Follow-up Appointments     Follow-up and recommended labs and tests      Follow up with primary care provider, Efraín Jeff, within 7 days   for hospital follow- up.  The following labs/tests are recommended: BMP   and CBC.           Discharge Disposition   Discharged to home  Condition at discharge: Stable    Hospital Course   Rick Rodrigues is a 77 year old male with who presents on 10/29/2022 with several bouts of right red blood per rectum and an episode of lightheadedness with loss of consciousness.     Lower GI bleed, suspect diverticular    H/o normal colonoscopies except diverticular disease, last was 5/26/2011. Patient reports negative Cologuard test earlier this year. Now BRBPR x 4 on aspirin. Appears stable hemodynamically and no further BRBPR since arrival. No pain or tenderness to suggest diverticulitis. Hgb 12.9   - follow overnight   -Hold aspirin at least a week   - check hgb in the morning and expect some down trending as patient is on IV fluids   -If appears stable, would not pursue colonoscopy on this admission and he could follow-up with his primary to consider whether that would be worthwhile   -Clear liquid diet and advance for lunch if doing well  - Patient passing only small amounts of blood in stools, likely retained blood.  No evidence of further large volume bloody stool and patient denies lightheadedness.     Syncope, suspect vasovagal event    After 2 bloody bowel movements, the patient had some mild abdominal cramping  and felt like he might need to have another bowel movement when he became lightheaded and then passed out.  He was out reportedly about 40 seconds.  He remembers coming to and was fully aware and oriented when he did.  He did feel tired.  No chest pain or palpitations.  He has not had a event like this before.  He was incontinent of urine with the event.  Troponin on admission was normal.  EKG showed right bundle branch block and otherwise normal without ischemic changes.   - observed on telemetry overnight, no arrythmias   - As no further events, can discharge home this morning     AAA (abdominal aortic aneurysm), known      3.5 cm. Minimal. Followed outpatient.      Coronary atherosclerosis?  Hyperlipidemia  Essential hypertension    Stress nuclear imaging in 2002 showed possible old small inferoseptal infarct without reversible ischemia. On nuclear stress in 2015, this was felt to be diaphragmatic attenuation artifact.  No chest pain.  Normal troponin.   - continue nifedipine XL   - continue rosuvastatin   - Hold aspirin for GI bleed     COVID-19 status negative  SARS-CoV-2 PCR negative on admission  Vaccinated and one booster       Consultations This Hospital Stay   None    Code Status   No CPR- Do NOT Intubate    Time Spent on this Encounter   I, Felipe Trevino MD, personally saw the patient today and spent greater than 30 minutes discharging this patient.       Felipe Trevino MD  [facility]  ______________________________________________________________________    Physical Exam   Vital Signs: Temp: 98.3  F (36.8  C) Temp src: Oral BP: (!) 159/93 Pulse: 88   Resp: 18 SpO2: 97 % O2 Device: None (Room air)    Weight: 207 lbs 7.25 oz       Gen: Well nourished, well developed, alert and oriented x 3, no acute distressed  HEENT: Atraumatic, normocephalic; sclera non-injected, anicterric; oral mucosa moist, no lesion, no exudate  Lungs: Clear to ausculation, no wheezes, no  rhonchi, no rales  Heart: Regular rate, regular rhythm, no gallops, no rubs, no murmurs  GI: Bowel sound normal, no hepatosplenomegaly, no masses, non-tender, non-distended, no guarding, no rebound tenderness  Lymph: No lymphadenopathy, no edema  Skin: No rashes, no chronic venous stasis     Primary Care Physician   Efraín Jeff    Discharge Orders      Reason for your hospital stay    This is a 77 year old male admitted with a diverticular bleed.     Follow-up and recommended labs and tests    Follow up with primary care provider, Efraín Jeff, within 7 days for hospital follow- up.  The following labs/tests are recommended: BMP and CBC.     Activity    Your activity upon discharge: activity as tolerated     Diet    Follow this diet upon discharge: Orders Placed This Encounter      Low salt low fat diet       Significant Results and Procedures   Most Recent 3 CBC's:Recent Labs   Lab Test 10/30/22  0609 10/29/22  2126 11/10/17  0730   WBC 7.9 11.5* 12.9*   HGB 11.0* 12.9* 13.1*   MCV 99 97 97    246 281     Most Recent 3 BMP's:Recent Labs   Lab Test 10/29/22  2126 11/10/17  0730    136   POTASSIUM 3.6 3.7   CHLORIDE 104 103   CO2 23 27   BUN 16.1 13   CR 0.74 0.80   ANIONGAP 14 6   ANIA 8.8 8.5   * 137*     Most Recent 3 Hemoglobins:Recent Labs   Lab Test 10/30/22  0609 10/29/22  2126 11/10/17  0730   HGB 11.0* 12.9* 13.1*   ,   Results for orders placed or performed during the hospital encounter of 10/29/22   POC US RETROPERITONEAL LIMITED    Impression    [facility] Procedure Note      Limited Bedside ED Aorta Ultrasound:    PROCEDURE: PERFORMED BY: Dr. Davon Siddiqui MD  INDICATIONS:  Syncope    PROBE:  Low frequency convex probe  BODY LOCATION: Abdomen  FINDINGS:  The ultrasound was performed using longitudinal and transverse views.   Normal: Abdominal aorta < 4 cm.  MEASUREMENT:  3.5 cm   No evidence of free fluid in hepatorenal (Morison's pouch), perisplenic, or  and pelvic areas. No evidence of pericardial effusion.  INTERPRETATION:  The evaluation of the aorta was of normal caliber (ie < 4cm in the transverse/longitudinal views) without evidence of aneurysm or dilation.  Aorta visualized in entirety from insertion into the intra-abdominal cavity to bifurcation into iliac vessels. There was no abdominal free fluid.  IMAGE DOCUMENTATION: Images were archived to PACs system.        Discharge Medications   Current Discharge Medication List      CONTINUE these medications which have NOT CHANGED    Details   acetaminophen (TYLENOL) 500 MG tablet Take 1,000 mg by mouth every 8 hours as needed       NIFEdipine ER osmotic (PROCARDIA XL) 60 MG TB24 Take 60 mg by mouth daily       omeprazole (PRILOSEC) 20 MG CR capsule Take 20 mg by mouth daily       rosuvastatin (CRESTOR) 10 MG tablet TAKE 1 TABLET BY MOUTH EVERYDAY AT BEDTIME      VITAMIN D, CHOLECALCIFEROL, PO Take 2,000 Units by mouth daily         STOP taking these medications       ibuprofen (ADVIL,MOTRIN) 600 MG tablet Comments:   Reason for Stopping:             Allergies   Allergies   Allergen Reactions     Aleve Rash     Naproxen Rash

## 2022-10-30 NOTE — ED TRIAGE NOTES
syncopal episode at home. Had bloody stools and incontinent of urine at home. Per wife she said he became unresponsive and she called ems. Ems states they saw a lot of bright red blood and clots in toilet

## 2022-10-30 NOTE — ED NOTES
[facility]   Admission Handoff    The patient is Rick Rodrigues, 77 year old who arrived in the ED by AMBULANCE from home with a complaint of Syncope and Rectal Bleeding  . The patient's current symptoms are new and during this time the symptoms have remained the same. In the ED, patient was diagnosed with   Final diagnoses:   Vasovagal syncope   Gastrointestinal hemorrhage, unspecified gastrointestinal hemorrhage type         Needed?: No    Allergies:    Allergies   Allergen Reactions    Aleve Rash    Naproxen Rash       Past Medical Hx: No past medical history on file.    Initial vitals were: BP: 124/73  Pulse: 102  Temp: 97.9  F (36.6  C)  Resp: 17  SpO2: 98 %   Recent vital Signs: /70   Pulse 92   Temp 97.9  F (36.6  C) (Oral)   Resp 20   SpO2 95%     Elimination Status: Continent: Yes     Activity Level: SBA    Fall Status: Reason for falls risk: Elimination      Baseline Mental status: WDL  Current Mental Status changes: at basesline    Infection present or suspected this encounter: no  Sepsis suspected: No    Isolation type: none    Bariatric equipment needed?: No    In the ED these meds were given:   Medications   lactated ringers BOLUS 1,000 mL (has no administration in time range)   pantoprazole (PROTONIX) IV push injection 40 mg (40 mg Intravenous Given 10/29/22 2141)       Drips running?  Yes    Home pump  No    Current LDAs: Peripheral IV: Site R & L AC; Gauge 18  lactated Ringer's     Results:   Labs/Imaging  Ordered and Resulted from Time of ED Arrival Up to the Time of Departure from the ED  Results for orders placed or performed during the hospital encounter of 10/29/22 (from the past 24 hour(s))   CBC with platelets differential    Narrative    The following orders were created for panel order CBC with platelets differential.  Procedure                               Abnormality         Status                     ---------                           RN cannot approve Refill Request    RN can NOT refill this medication historical medication requested     . Last office visit: 2/19/2019 Harriet Vo MD Last Physical: Visit date not found Last MTM visit: Visit date not found Last visit same specialty: 2/19/2019 Harriet Vo MD.  Next visit within 3 mo: Visit date not found  Next physical within 3 mo: Visit date not found      Hilda Godinez, [facility] Triage/Med Refill 2/27/2019    Requested Prescriptions   Pending Prescriptions Disp Refills     hydroCHLOROthiazide (HYDRODIURIL) 25 MG tablet 90 tablet 3     Sig: Take 1 tablet (25 mg total) by mouth daily.    There is no refill protocol information for this order           Lab Results   Component Value Date    CREATININE 1.11 02/13/2019    BUN 14 02/13/2019     02/13/2019    K 3.9 02/13/2019     02/13/2019    CO2 31 02/13/2019             -----------         ------                     CBC with platelets and d...[[id number]]  Abnormal            Final result                 Please view results for these tests on the individual orders.   Comprehensive metabolic panel   Result Value Ref Range    Sodium 141 136 - 145 mmol/L    Potassium 3.6 3.4 - 5.3 mmol/L    Chloride 104 98 - 107 mmol/L    Carbon Dioxide (CO2) 23 22 - 29 mmol/L    Anion Gap 14 7 - 15 mmol/L    Urea Nitrogen 16.1 8.0 - 23.0 mg/dL    Creatinine 0.74 0.67 - 1.17 mg/dL    Calcium 8.8 8.8 - 10.2 mg/dL    Glucose 105 (H) 70 - 99 mg/dL    Alkaline Phosphatase 76 40 - 129 U/L    AST 19 10 - 50 U/L    ALT 16 10 - 50 U/L    Protein Total 6.4 6.4 - 8.3 g/dL    Albumin 3.9 3.5 - 5.2 g/dL    Bilirubin Total 0.6 <=1.2 mg/dL    GFR Estimate >90 >60 mL/min/1.73m2   Troponin T, High Sensitivity   Result Value Ref Range    Troponin T, High Sensitivity 16 <=22 ng/L   ABO/Rh type and screen    Narrative    The following orders were created for panel order ABO/Rh type and screen.  Procedure                               Abnormality         Status                     ---------                               -----------         ------                     Adult Type and Screen[[id number]]                            Final result                 Please view results for these tests on the individual orders.   [place] Draw    Narrative    The following orders were created for panel order [place] Draw.  Procedure                               Abnormality         Status                     ---------                               -----------         ------                     Extra Blue Top Tube[[id number]]                              Final result               Extra Red Top Tube[[id number]]                               Final result               Extra Green Top (Lithium...[[id number]]                      Final result               Extra Purple Top Tube[[id number]]                            Final result                  Please view results for these tests on the individual orders.   CBC with platelets and differential   Result Value Ref Range    WBC Count 11.5 (H) 4.0 - 11.0 10e3/uL    RBC Count 3.96 (L) 4.40 - 5.90 10e6/uL    Hemoglobin 12.9 (L) 13.3 - 17.7 g/dL    Hematocrit 38.2 (L) 40.0 - 53.0 %    MCV 97 78 - 100 fL    MCH 32.6 26.5 - 33.0 pg    MCHC 33.8 31.5 - 36.5 g/dL    RDW 12.5 10.0 - 15.0 %    Platelet Count 246 150 - 450 10e3/uL    % Neutrophils 70 %    % Lymphocytes 18 %    % Monocytes 9 %    % Eosinophils 2 %    % Basophils 1 %    % Immature Granulocytes 0 %    NRBCs per 100 WBC 0 <1 /100    Absolute Neutrophils 8.1 1.6 - 8.3 10e3/uL    Absolute Lymphocytes 2.1 0.8 - 5.3 10e3/uL    Absolute Monocytes 1.0 0.0 - 1.3 10e3/uL    Absolute Eosinophils 0.3 0.0 - 0.7 10e3/uL    Absolute Basophils 0.1 0.0 - 0.2 10e3/uL    Absolute Immature Granulocytes 0.0 <=0.4 10e3/uL    Absolute NRBCs 0.0 10e3/uL   Adult Type and Screen   Result Value Ref Range    ABO/RH(D) O POS     Antibody Screen Negative Negative    SPECIMEN EXPIRATION DATE [id number]    Extra Blue Top Tube   Result Value Ref Range    Hold Specimen JIC    Extra Red Top Tube   Result Value Ref Range    Hold Specimen JIC    Extra Green Top (Lithium Heparin) Tube   Result Value Ref Range    Hold Specimen JIC    Extra Purple Top Tube   Result Value Ref Range    Hold Specimen JIC        For the majority of the shift this patient's behavior was Green     Cardiac Rhythm: N/A  Pt needs tele? No  Skin/wound Issues: None    Code Status: Full Code    Pain control: good    Nausea control: pt had none    Abnormal labs/tests/findings requiring intervention:     Patient tested for COVID 19 prior to admission: YES     OBS brochure/video discussed/provided to patient/family: Yes     Family present during ED course? Yes     Family Comments/Social Situation comments:     Tasks needing completion: None    Stephanie Hughes RN

## 2022-10-30 NOTE — ED PROVIDER NOTES
History     Chief Complaint   Patient presents with     Syncope     Rectal Bleeding     HPI  Rick Rodrigues is a 77 year old male with a history of AAA being monitored, esophageal reflux, and hyperlipidemia who presents for unresponsive episode and for red blood per rectum.  The patient reports that today he has had multiple episodes of blood and clot passed from his rectum.  He has had some mild cramping with this but none currently.  This evening prior to coming in he was sitting on the couch and his wife found him great, diaphoretic, unresponsive, and he was incontinent of urine.  He came to and said he needed to go to the bathroom and went back to the bathroom and had another large clotted bowel movement.  EMS arrived and found him sitting on the toilet with the large amount of blood and clot in the toilet bowl.  They brought him here for further evaluation.  Blood glucose was normal in route.  His vital signs remained normal in route as well.  The patient says that he is feeling okay right now.  He is never had something this happen before.  He denies any abdominal pain, nausea, or vomiting.  No fevers, chills, headache, chest pain, shortness of breath.  He has not been ill recently.  He is not on blood thinners.    Allergies:  Allergies   Allergen Reactions     Aleve Rash     Naproxen Rash       Problem List:    Patient Active Problem List    Diagnosis Date Noted     Bilateral recurrent inguinal hernia without obstruction or gangrene 10/14/2020     Priority: Medium     Added automatically from request for surgery [id number]       S/P total knee replacement, right 10/16/2017     Priority: Medium     Sleep apnea 12/21/2012     Priority: Medium     AAA (abdominal aortic aneurysm) 09/02/2010     Priority: Medium     Overview:   3 cm just above bifurcation  Recommend q2-3yr u/s monitoring  No change dec 2014 repeat 2017       Coronary atherosclerosis 07/18/2007     Priority: Medium     Overview:   pos heart scan  "1/05  Neg cardiolyte gxt 2003, 1/2008,  Ej fx 57%       Hyperlipidemia 07/18/2007     Priority: Medium     Esophageal reflux 07/18/2007     Priority: Medium     Essential hypertension 07/18/2007     Priority: Medium        Past Medical History:    No past medical history on file.    Past Surgical History:    Past Surgical History:   Procedure Laterality Date     AS TOTAL KNEE ARTHROPLASTY Bilateral      COLONOSCOPY  12/20/2010    COLONOSCOPY performed by KURT SCOTT at [facility]     COLONOSCOPY  05/26/2011    Procedure:COLONOSCOPY; Surgeon:KURT SCOTT; Location:[facility]     HERNIORRHAPHY INGUINAL Right 1990    open with mesh     LAPAROSCOPIC HERNIORRHAPHY INGUINAL BILATERAL Right 10/27/2020    Procedure: RIGHT HERNIORRHAPHY, INGUINAL LAPAROSCOPIC ASSISTED OPEN WITH MESH;  Surgeon: Paresh Linares MD;  Location: [facility]       Family History:    No family history on file.    Social History:  Marital Status:   [2]  Social History     Tobacco Use     Smoking status: Former     Smokeless tobacco: Never   Substance Use Topics     Alcohol use: Yes     Alcohol/week: 12.0 standard drinks     Types: 12 Cans of beer per week     Drug use: Never        Medications:    No current outpatient medications on file.        Review of Systems  Pertinent positives and negatives listed in the HPI, all other systems reviewed and are negative.    Physical Exam   BP: 124/73  Pulse: 102  Temp: 97.9  F (36.6  C)  Resp: 17  Height: 172.7 cm (5' 8\")  Weight: 94.1 kg (207 lb 7.3 oz)  SpO2: 98 %      Physical Exam  Vitals and nursing note reviewed.   Constitutional:       General: He is in acute distress.      Appearance: He is well-developed and well-nourished. He is not diaphoretic.   HENT:      Head: Normocephalic and atraumatic.      Right Ear: External ear normal.      Left Ear: External ear normal.      Nose: Nose normal.   Eyes:      General: No scleral icterus.     Conjunctiva/sclera: Conjunctivae normal.   Cardiovascular:      " Rate and Rhythm: Normal rate and regular rhythm.   Pulmonary:      Effort: Pulmonary effort is normal. No respiratory distress.      Breath sounds: No stridor.   Abdominal:      General: There is no distension.      Palpations: Abdomen is soft.      Tenderness: There is no abdominal tenderness. There is no guarding or rebound.   Genitourinary:     Rectum: Normal. No tenderness or external hemorrhoid.   Musculoskeletal:      Cervical back: Normal range of motion.   Skin:     General: Skin is warm and dry.   Neurological:      Mental Status: He is alert and oriented to person, place, and time.   Psychiatric:         Mood and Affect: Mood and affect normal.         Behavior: Behavior normal.         ED Course                 Procedures    Results for orders placed during the hospital encounter of 10/29/22    POC US RETROPERITONEAL LIMITED    Impression  [facility] Procedure Note    Limited Bedside ED Aorta Ultrasound:    PROCEDURE: PERFORMED BY: Dr. Davon Siddiqui MD  INDICATIONS:  Syncope  PROBE:  Low frequency convex probe  BODY LOCATION: Abdomen  FINDINGS:  The ultrasound was performed using longitudinal and transverse views.  Normal: Abdominal aorta < 4 cm.  MEASUREMENT:  3.5 cm  No evidence of free fluid in hepatorenal (Morison's pouch), perisplenic, or and pelvic areas. No evidence of pericardial effusion.  INTERPRETATION:  The evaluation of the aorta was of normal caliber (ie < 4cm in the transverse/longitudinal views) without evidence of aneurysm or dilation.  Aorta visualized in entirety from insertion into the intra-abdominal cavity to bifurcation into iliac vessels. There was no abdominal free fluid.  IMAGE DOCUMENTATION: Images were archived to PACs system.            EKG Interpretation:      Interpreted by Davon Siddiqui MD  Time reviewed: 2143  Symptoms at time of EKG: Syncope   Rhythm: sinus   Rate: 94  Axis: Normal  Ectopy: none  Conduction: right bundle branch block (complete)  ST  Segments/ T Waves: No acute ischemic changes  Q Waves: aVf  Comparison to prior: Right bundle branch block since 2017    Clinical Impression: Sinus rhythm with right bundle branch block    Critical Care time:  none               Results for orders placed or performed during the hospital encounter of 10/29/22 (from the past 24 hour(s))   POC US RETROPERITONEAL LIMITED    Impression    [facility] Procedure Note      Limited Bedside ED Aorta Ultrasound:    PROCEDURE: PERFORMED BY: Dr. Davon Siddiqui MD  INDICATIONS:  Syncope    PROBE:  Low frequency convex probe  BODY LOCATION: Abdomen  FINDINGS:  The ultrasound was performed using longitudinal and transverse views.   Normal: Abdominal aorta < 4 cm.  MEASUREMENT:  3.5 cm   No evidence of free fluid in hepatorenal (Morison's pouch), perisplenic, or and pelvic areas. No evidence of pericardial effusion.  INTERPRETATION:  The evaluation of the aorta was of normal caliber (ie < 4cm in the transverse/longitudinal views) without evidence of aneurysm or dilation.  Aorta visualized in entirety from insertion into the intra-abdominal cavity to bifurcation into iliac vessels. There was no abdominal free fluid.  IMAGE DOCUMENTATION: Images were archived to PACs system.    CBC with platelets differential    Narrative    The following orders were created for panel order CBC with platelets differential.  Procedure                               Abnormality         Status                     ---------                               -----------         ------                     CBC with platelets and d...[[id number]]  Abnormal            Final result                 Please view results for these tests on the individual orders.   Comprehensive metabolic panel   Result Value Ref Range    Sodium 141 136 - 145 mmol/L    Potassium 3.6 3.4 - 5.3 mmol/L    Chloride 104 98 - 107 mmol/L    Carbon Dioxide (CO2) 23 22 - 29 mmol/L    Anion Gap 14 7 - 15 mmol/L    Urea Nitrogen 16.1  8.0 - 23.0 mg/dL    Creatinine 0.74 0.67 - 1.17 mg/dL    Calcium 8.8 8.8 - 10.2 mg/dL    Glucose 105 (H) 70 - 99 mg/dL    Alkaline Phosphatase 76 40 - 129 U/L    AST 19 10 - 50 U/L    ALT 16 10 - 50 U/L    Protein Total 6.4 6.4 - 8.3 g/dL    Albumin 3.9 3.5 - 5.2 g/dL    Bilirubin Total 0.6 <=1.2 mg/dL    GFR Estimate >90 >60 mL/min/1.73m2   Troponin T, High Sensitivity   Result Value Ref Range    Troponin T, High Sensitivity 16 <=22 ng/L   ABO/Rh type and screen    Narrative    The following orders were created for panel order ABO/Rh type and screen.  Procedure                               Abnormality         Status                     ---------                               -----------         ------                     Adult Type and Screen[[id number]]                            Final result                 Please view results for these tests on the individual orders.   [place] Draw    Narrative    The following orders were created for panel order [place] Draw.  Procedure                               Abnormality         Status                     ---------                               -----------         ------                     Extra Blue Top Tube[[id number]]                              Final result               Extra Red Top Tube[[id number]]                               Final result               Extra Green Top (Lithium...[[id number]]                      Final result               Extra Purple Top Tube[[id number]]                            Final result                 Please view results for these tests on the individual orders.   CBC with platelets and differential   Result Value Ref Range    WBC Count 11.5 (H) 4.0 - 11.0 10e3/uL    RBC Count 3.96 (L) 4.40 - 5.90 10e6/uL    Hemoglobin 12.9 (L) 13.3 - 17.7 g/dL    Hematocrit 38.2 (L) 40.0 - 53.0 %    MCV 97 78 - 100 fL    MCH 32.6 26.5 - 33.0 pg    MCHC 33.8 31.5 - 36.5 g/dL    RDW 12.5 10.0 - 15.0 %    Platelet Count 246 150 - 450 10e3/uL    %  Neutrophils 70 %    % Lymphocytes 18 %    % Monocytes 9 %    % Eosinophils 2 %    % Basophils 1 %    % Immature Granulocytes 0 %    NRBCs per 100 WBC 0 <1 /100    Absolute Neutrophils 8.1 1.6 - 8.3 10e3/uL    Absolute Lymphocytes 2.1 0.8 - 5.3 10e3/uL    Absolute Monocytes 1.0 0.0 - 1.3 10e3/uL    Absolute Eosinophils 0.3 0.0 - 0.7 10e3/uL    Absolute Basophils 0.1 0.0 - 0.2 10e3/uL    Absolute Immature Granulocytes 0.0 <=0.4 10e3/uL    Absolute NRBCs 0.0 10e3/uL   Adult Type and Screen   Result Value Ref Range    ABO/RH(D) O POS     Antibody Screen Negative Negative    SPECIMEN EXPIRATION DATE [id number]    Extra Blue Top Tube   Result Value Ref Range    Hold Specimen JIC    Extra Red Top Tube   Result Value Ref Range    Hold Specimen JIC    Extra Green Top (Lithium Heparin) Tube   Result Value Ref Range    Hold Specimen JIC    Extra Purple Top Tube   Result Value Ref Range    Hold Specimen JIC        Medications   pantoprazole (PROTONIX) IV push injection 40 mg (40 mg Intravenous Given 10/29/22 2141)   lactated ringers BOLUS 1,000 mL (1,000 mLs Intravenous New Bag 10/29/22 0283)       Assessments & Plan (with Medical Decision Making)   77-year-old male with a history of AAA being monitored, esophageal reflux, and hyperlipidemia who presents for unresponsive episode and for red blood per rectum.  Vital signs are reassuring here with a blood pressure of 124/73, temperature is 36.6  C, heart rate is 98, SPO2 is 97% on room air.  It sounds as if he had a vasovagal episode while sitting on the chair today and that is what caused him to be unresponsive in the incontinent of urine.  This is likely related to the passage of blood into his bowels.  He is feeling better now.  EKG shows sinus rhythm with a right bundle branch block, no signs of ischemia or new dysrhythmia such as WPW, Brugada syndrome, prolonged QT syndrome, or arrhythmogenic right ventricular dysplasia.  Electrolytes are within normal limits.  His white  blood cell count is 11 point no signs of significant anemia.  He is given IV fluids.  LFTs are normal.  Troponin is normal.  Bedside ultrasound of his aorta is reassuring measurement of 3.5 cm across.  Most likely given his description of events he is having a diverticular bleed that resulted in a vasovagal episode with syncope.  Given his age and symptoms I believe that he warrants observation in the hospital for close monitoring.  I discussed case with the on-call hospitalist, Dr. Rowan who agrees to admit the patient to his service.    I have reviewed the nursing notes.    I have reviewed the findings, diagnosis, plan and need for follow up with the patient.       Current Discharge Medication List          Final diagnoses:   Vasovagal syncope   Gastrointestinal hemorrhage, unspecified gastrointestinal hemorrhage type       10/29/2022   [facility] EMERGENCY DEPT     Davon Siddiqui MD  10/30/22 0026

## 2022-10-30 NOTE — PROGRESS NOTES
"[facility] ADMISSION NOTE    Patient admitted to room 2312 at approximately 0005 via cart from emergency room. Patient was accompanied by transport tech.     Verbal SBAR report received from Edith GARZA prior to patient arrival.     Patient ambulated to bed independently. Patient alert and oriented X 4  . The patient is not having any pain.  . Admission vital signs: Blood pressure 133/78, pulse 98, temperature 98.5  F (36.9  C), temperature source Oral, resp. rate 18, height 1.727 m (5' 8\"), weight 94.1 kg (207 lb 7.3 oz), SpO2 95 %. Patient was oriented to plan of care, call light, bed controls, tv, telephone, bathroom and visiting hours.     Risk Assessment    The following safety risks were identified during admission: fall. Yellow risk band applied: YES.     Skin Initial Assessment    This writer admitted this patient and completed a full skin assessment and Ajay score in the Adult PCS flowsheet. Appropriate interventions initiated as needed.     Secondary skin check declined by patient.    Ajay Risk Assessment  Sensory Perception: 4-->no impairment  Moisture: 4-->rarely moist  Activity: 4-->walks frequently  Mobility: 4-->no limitation  Nutrition: 3-->adequate  Friction and Shear: 3-->no apparent problem  Ajay Score: 22  Mattress: Standard Hospital Mattress (Foam)  Bed Frame: Standard width and length    Education    Patient has a [place] to Observation order: Yes  Observation education completed and documented: Yes      Neelima Shi RN    "

## 2022-10-30 NOTE — H&P
[facility]    History and Physical  Hospital Medicine       Date of Admission:  10/29/2022  Date of Service: 10/30/2022     Assessment & Plan   Rick Rodrigues is a 77 year old male with who presents on 10/29/2022 with several bouts of right red blood per rectum and an episode of lightheadedness with loss of consciousness.    Lower GI bleed, suspect diverticular    H/o normal colonoscopies except diverticular disease, last was 5/26/2011. Patient reports negative Cologuard test earlier this year. Now BRBPR x 4 on aspirin. Appears stable hemodynamically and no further BRBPR since arrival. No pain or tenderness to suggest diverticulitis. Hgb 12.9   - follow overnight   -Hold aspirin at least a week   - check hgb in the morning and expect some down trending as patient is on IV fluids   -If appears stable, would not pursue colonoscopy on this admission and he could follow-up with his primary to consider whether that would be worthwhile   -Clear liquid diet and advance for lunch if doing well    Syncope, suspect vasovagal event    After 2 bloody bowel movements, the patient had some mild abdominal cramping and felt like he might need to have another bowel movement when he became lightheaded and then passed out.  He was out reportedly about 40 seconds.  He remembers coming to and was fully aware and oriented when he did.  He did feel tired.  No chest pain or palpitations.  He has not had a event like this before.  He was incontinent of urine with the event.  Troponin on admission was normal.  EKG showed right bundle branch block and otherwise normal without ischemic changes.   - observe on telemetry overnight   - if no further events, can discharge home after one night      AAA (abdominal aortic aneurysm), known      3.5 cm. Minimal. Followed outpatient.     Coronary atherosclerosis?  Hyperlipidemia  Essential hypertension    Stress nuclear imaging in 2002 showed possible old small  "inferoseptal infarct without reversible ischemia. On nuclear stress in 2015, this was felt to be diaphragmatic attenuation artifact.  No chest pain.  Normal troponin.   - continue nifedipine XL   - continue rosuvastatin   - Hold aspirin for GI bleed    COVID-19 status  SARS-CoV-2 PCR negative on admission  Vaccinated and one booster    Clinically Significant Risk Factors Present on Admission                      # Obesity: Estimated body mass index is 31.54 kg/m  as calculated from the following:    Height as of this encounter: 1.727 m (5' 8\").    Weight as of this encounter: 94.1 kg (207 lb 7.3 oz).         Diet: Clear Liquid Diet    DVT Prophylaxis: Low Risk/Ambulatory with no VTE prophylaxis indicated  Mooney Catheter: Not present  Central Lines: None  Code Status: No CPR- Do NOT Intubate       Disposition: Anticipate discharge in 1 day(s) once patient remains stable. Appropriate for observation care    Rudy Rowan MD  [facility]        Primary Care Physician   Efraín Jeff [phone]    History is obtained from the patient who is a good historian, discussion with ER physician and review of old records via the EMR.    History of Present Illness   Rick Rodrigues is a 77 year old male who presents with bright red blood per rectum and then had a fainting spell.  Patient was feeling in good health this morning had a bowel movement and noticed there was bright red blood.  About half an hour later he passed another bloody stool.  He is not had any melena.  He has not had bright red blood per rectum before.  He had an English muffin and then felt some mild abdominal cramping and that he may have another bowel movement then became lightheaded and then passed out.  He was noted to be clammy appearing.  He did not have chest pain or palpitations.  Denied any nausea at that time.  He was incontinent of urine.  After he woke up in approximately 40 seconds he was fully aware she still tired.  EMS was " called.  Prior to their arrival he had 2 more bloody bowel movements.  Since arrival to the ED he has not had any further bloody bowel movements.  He did sit down once and passed some gas.  Has had no abdominal pain with this.  He does occasionally get right lower quadrant abdominal pains associated with a prior hernia repair but none currently.  He is on aspirin but not on any anticoagulation.  He has had several colonoscopies with the last one in 2011 was normal except for extensive diverticulosis.  He was told he would not need another colonoscopy.  He did have he believes a Cologuard earlier this year that was negative.  He is feeling better and hopes to go home before [employer] at 2:30 on Sunday.    Review of Systems   The 10 point Review of Systems is negative other than noted in the HPI or here.  You should take a ibuprofen regularly but he stopped over a month ago secondary to some bruising.  The bruising got better.  Had about a 30 pound weight loss over the last year or so but that is intentional.  He does get swelling in his ankles if he has been up for a while.  No other pertinent findings.    Past Medical History        Bilateral recurrent inguinal hernia without obstruction or gangrene 10/14/2020     Priority: Medium     Added automatically from request for surgery [id number]       S/P total knee replacement, right 10/16/2017     Priority: Medium     Sleep apnea 12/21/2012     Priority: Medium     AAA (abdominal aortic aneurysm) 09/02/2010     Priority: Medium     Overview:   3 cm just above bifurcation  Recommend q2-3yr u/s monitoring  No change dec 2014 repeat 2017       Coronary atherosclerosis 07/18/2007     Priority: Medium     Overview:   pos heart scan 1/05  Neg cardiolyte gxt 2003, 1/2008,  Ej fx 57%       Hyperlipidemia 07/18/2007     Priority: Medium     Esophageal reflux 07/18/2007     Priority: Medium     Essential hypertension 07/18/2007     Priority: Medium        Past Surgical History   Past  Surgical History:   Procedure Laterality Date     AS TOTAL KNEE ARTHROPLASTY Bilateral      COLONOSCOPY  2010    COLONOSCOPY performed by KURT SCOTT at [facility]     COLONOSCOPY  2011    Procedure:COLONOSCOPY; Surgeon:KURT SCOTT; Location:[facility]     HERNIORRHAPHY INGUINAL Right 1990    open with mesh     LAPAROSCOPIC HERNIORRHAPHY INGUINAL BILATERAL Right 10/27/2020    Procedure: RIGHT HERNIORRHAPHY, INGUINAL LAPAROSCOPIC ASSISTED OPEN WITH MESH;  Surgeon: Paresh Linares MD;  Location: [facility]        Prior to Admission Medications   Prior to Admission Medications   Prescriptions Last Dose Informant Patient Reported? Taking?   NIFEdipine ER osmotic (PROCARDIA XL) 60 MG TB24 10/29/2022 at evening  Yes Yes   Sig: Take 60 mg by mouth daily    VITAMIN D, CHOLECALCIFEROL, PO 10/29/2022 at evening  Yes Yes   Sig: Take 2,000 Units by mouth daily   acetaminophen (TYLENOL) 500 MG tablet 10/29/2022 at unknown  Yes Yes   Sig: Take 1,000 mg by mouth every 8 hours as needed    ibuprofen (ADVIL,MOTRIN) 600 MG tablet Past Month  Yes Yes   Sig: Take 600 mg by mouth every 8 hours as needed    omeprazole (PRILOSEC) 20 MG CR capsule 10/29/2022 at evening  Yes Yes   Sig: Take 20 mg by mouth daily    rosuvastatin (CRESTOR) 10 MG tablet 10/29/2022 at evening  Yes Yes   Sig: TAKE 1 TABLET BY MOUTH EVERYDAY AT BEDTIME      Facility-Administered Medications: None     Allergies   Allergies   Allergen Reactions     Aleve Rash     Naproxen Rash       Family History    Mother  age 67 of leukemia.  Father  age 80.    Social History   Social History     Socioeconomic History     Marital status:      Spouse name: Not on file     Number of children:  None     Years of education: Not on file     Highest education level: Not on file   Occupational History      Retired autobody repair   Tobacco Use     Smoking status: Former     Smokeless tobacco: Never   Substance and Sexual Activity     Alcohol use: Yes      "Alcohol/week: 12.0 standard drinks     Types: 12 Cans of beer per week     Drug use: Never     Sexual activity: Not on file   Other Topics Concern     Not on file   Social History Narrative     Not on file     Physical Exam   /78 (BP Location: Left arm, Patient Position: Supine)   Pulse 98   Temp 98.5  F (36.9  C) (Oral)   Resp 18   Ht 1.727 m (5' 8\")   Wt 94.1 kg (207 lb 7.3 oz)   SpO2 95%   BMI 31.54 kg/m       Weight: 207 lbs 7.25 oz Body mass index is 31.54 kg/m .     Constitutional: Alert, oriented, cooperative, no apparent distress, appears nontoxic  Eyes: Eyes are clear, pupils are reactive.  HEENT: Oropharynx is clear and moist. No evidence of cranial trauma.  Lymph/Hematologic: No epitrochlear, axillary, anterior or posterior cervical, or supraclavicular lymphadenopathy is appreciated.  Cardiovascular: Regular rate and rhythm, normal S1 and S2, and no murmur noted. JVP is normal. Good peripheral pulses in wrists bilaterally.  There is 1-2+ bilateral lower extremity edema.  Respiratory: Clear to auscultation bilaterally.   GI: Soft, non-tender, normal bowel sounds, no hepatomegaly.  Genitourinary: Deferred  Musculoskeletal: Normal muscle bulk and tone.  Skin: Warm and dry, no rashes.   Neurologic: Neck supple. Cranial nerves are grossly intact.  is symmetric.     Data   Data reviewed today:   Recent Labs   Lab 10/29/22  2126   WBC 11.5*   HGB 12.9*   MCV 97         POTASSIUM 3.6   CHLORIDE 104   CO2 23   BUN 16.1   CR 0.74   ANIONGAP 14   ANIA 8.8   *   ALBUMIN 3.9   PROTTOTAL 6.4   BILITOTAL 0.6   ALKPHOS 76   ALT 16   AST 19       Recent Results (from the past 24 hour(s))   POC US RETROPERITONEAL LIMITED    Impression    [facility] Note      Limited Bedside ED Aorta Ultrasound:    PROCEDURE: PERFORMED BY: Dr. Davon Siddiqui MD  INDICATIONS:  Syncope    PROBE:  Low frequency convex probe  BODY LOCATION: Abdomen  FINDINGS:  The ultrasound was " performed using longitudinal and transverse views.   Normal: Abdominal aorta < 4 cm.  MEASUREMENT:  3.5 cm   No evidence of free fluid in hepatorenal (Morison's pouch), perisplenic, or and pelvic areas. No evidence of pericardial effusion.  INTERPRETATION:  The evaluation of the aorta was of normal caliber (ie < 4cm in the transverse/longitudinal views) without evidence of aneurysm or dilation.  Aorta visualized in entirety from insertion into the intra-abdominal cavity to bifurcation into iliac vessels. There was no abdominal free fluid.  IMAGE DOCUMENTATION: Images were archived to PACs system.        I personally reviewed the EKG tracing showing Normal sinus rhythm with no ischemic changes.    Rudy Rowan MD  [facility]

## 2022-11-19 ENCOUNTER — HEALTH MAINTENANCE LETTER (OUTPATIENT)
Age: 77
End: 2022-11-19

## 2024-01-28 ENCOUNTER — HEALTH MAINTENANCE LETTER (OUTPATIENT)
Age: 79
End: 2024-01-28

## 2025-02-01 ENCOUNTER — HEALTH MAINTENANCE LETTER (OUTPATIENT)
Age: 80
End: 2025-02-01

## (undated) DEVICE — DRAPE POUCH INSTRUMENT 3 POCKET 1018L

## (undated) DEVICE — ENDO TROCAR BLUNT TIP KII BALLOON ADV FIX 12X100MM C0K11

## (undated) DEVICE — SUCTION IRR STRYKERFLOW II W/TIP 250-070-520

## (undated) DEVICE — DEVICE ABSORBABLE TACK 5MM SINGLE ABSTACK30

## (undated) DEVICE — BLADE CLIPPER 4406

## (undated) DEVICE — ADH SKIN CLOSURE PREMIERPRO EXOFIN 1.0ML 3470

## (undated) DEVICE — SU VICRYL 3-0 SH 27" UND J416H

## (undated) DEVICE — Device

## (undated) DEVICE — ESU HOLSTER PLASTIC DISP E2400

## (undated) DEVICE — GLOVE PROTEXIS MICRO 7.0  2D73PM70

## (undated) DEVICE — SU VICRYL 0 UR-6 27" J603H

## (undated) DEVICE — CLIP APPLIER ENDO 5MM M/L LIGAMAX EL5ML

## (undated) DEVICE — ESU PENCIL W/COATED BLADE E2450H

## (undated) DEVICE — GLOVE PROTEXIS W/NEU-THERA 7.5  2D73TE75

## (undated) DEVICE — SU VICRYL 2-0 REEL 54" UND J286G

## (undated) DEVICE — SOL NACL 0.9% IRRIG 3000ML BAG 07972-08

## (undated) DEVICE — GLOVE PROTEXIS BLUE W/NEU-THERA 7.0  2D73EB70

## (undated) DEVICE — SU VICRYL 0 CT-2 27" UND J270H

## (undated) DEVICE — GLOVE PROTEXIS BLUE W/NEU-THERA 8.0  2D73EB80

## (undated) DEVICE — GOWN XLG DISP 9545

## (undated) DEVICE — STOCKING SLEEVE COMPRESSION CALF MED

## (undated) DEVICE — SOL NACL 0.9% IRRIG 1000ML BOTTLE 07138-09

## (undated) DEVICE — PREP CHLORAPREP 26ML TINTED ORANGE  260815

## (undated) DEVICE — SOL WATER IRRIG 1000ML BOTTLE 07139-09

## (undated) DEVICE — SPONGE RAY-TEC 4X8" 7318

## (undated) DEVICE — SU VICRYL 4-0 FS-2 27" J422-H

## (undated) DEVICE — ESU ENDO SCISSORS 5MM CVD 5DCS

## (undated) DEVICE — GLOVE PROTEXIS W/NEU-THERA 8.0  2D73TE80

## (undated) RX ORDER — BUPIVACAINE HYDROCHLORIDE AND EPINEPHRINE 2.5; 5 MG/ML; UG/ML
INJECTION, SOLUTION EPIDURAL; INFILTRATION; INTRACAUDAL; PERINEURAL
Status: DISPENSED
Start: 2020-10-27

## (undated) RX ORDER — PROPOFOL 10 MG/ML
INJECTION, EMULSION INTRAVENOUS
Status: DISPENSED
Start: 2020-10-27

## (undated) RX ORDER — GLYCOPYRROLATE 0.2 MG/ML
INJECTION, SOLUTION INTRAMUSCULAR; INTRAVENOUS
Status: DISPENSED
Start: 2020-10-27

## (undated) RX ORDER — LIDOCAINE HYDROCHLORIDE 10 MG/ML
INJECTION, SOLUTION EPIDURAL; INFILTRATION; INTRACAUDAL; PERINEURAL
Status: DISPENSED
Start: 2020-10-27

## (undated) RX ORDER — CEFAZOLIN SODIUM 2 G/100ML
INJECTION, SOLUTION INTRAVENOUS
Status: DISPENSED
Start: 2020-10-27

## (undated) RX ORDER — ACETAMINOPHEN 325 MG/1
TABLET ORAL
Status: DISPENSED
Start: 2020-10-27

## (undated) RX ORDER — DEXAMETHASONE SODIUM PHOSPHATE 4 MG/ML
INJECTION, SOLUTION INTRA-ARTICULAR; INTRALESIONAL; INTRAMUSCULAR; INTRAVENOUS; SOFT TISSUE
Status: DISPENSED
Start: 2020-10-27

## (undated) RX ORDER — FENTANYL CITRATE 50 UG/ML
INJECTION, SOLUTION INTRAMUSCULAR; INTRAVENOUS
Status: DISPENSED
Start: 2020-10-27

## (undated) RX ORDER — GABAPENTIN 300 MG/1
CAPSULE ORAL
Status: DISPENSED
Start: 2020-10-27

## (undated) RX ORDER — ONDANSETRON 2 MG/ML
INJECTION INTRAMUSCULAR; INTRAVENOUS
Status: DISPENSED
Start: 2020-10-27

## (undated) RX ORDER — HYDROCODONE BITARTRATE AND ACETAMINOPHEN 5; 325 MG/1; MG/1
TABLET ORAL
Status: DISPENSED
Start: 2020-10-27